# Patient Record
Sex: MALE | Race: WHITE | NOT HISPANIC OR LATINO | URBAN - METROPOLITAN AREA
[De-identification: names, ages, dates, MRNs, and addresses within clinical notes are randomized per-mention and may not be internally consistent; named-entity substitution may affect disease eponyms.]

---

## 2017-02-08 ENCOUNTER — OUTPATIENT (OUTPATIENT)
Dept: OUTPATIENT SERVICES | Facility: HOSPITAL | Age: 77
LOS: 1 days | Discharge: HOME | End: 2017-02-08

## 2017-06-27 DIAGNOSIS — E78.5 HYPERLIPIDEMIA, UNSPECIFIED: ICD-10-CM

## 2017-06-27 DIAGNOSIS — D64.9 ANEMIA, UNSPECIFIED: ICD-10-CM

## 2017-06-27 DIAGNOSIS — I10 ESSENTIAL (PRIMARY) HYPERTENSION: ICD-10-CM

## 2017-06-27 DIAGNOSIS — E78.4 OTHER HYPERLIPIDEMIA: ICD-10-CM

## 2017-06-27 DIAGNOSIS — Z00.00 ENCOUNTER FOR GENERAL ADULT MEDICAL EXAMINATION WITHOUT ABNORMAL FINDINGS: ICD-10-CM

## 2017-06-27 DIAGNOSIS — N39.0 URINARY TRACT INFECTION, SITE NOT SPECIFIED: ICD-10-CM

## 2017-06-27 DIAGNOSIS — E55.9 VITAMIN D DEFICIENCY, UNSPECIFIED: ICD-10-CM

## 2017-06-27 DIAGNOSIS — E11.9 TYPE 2 DIABETES MELLITUS WITHOUT COMPLICATIONS: ICD-10-CM

## 2017-06-27 DIAGNOSIS — E13.9 OTHER SPECIFIED DIABETES MELLITUS WITHOUT COMPLICATIONS: ICD-10-CM

## 2017-06-27 DIAGNOSIS — D64.0 HEREDITARY SIDEROBLASTIC ANEMIA: ICD-10-CM

## 2017-08-05 ENCOUNTER — INPATIENT (INPATIENT)
Facility: HOSPITAL | Age: 77
LOS: 5 days | Discharge: NURSING, REHAB OR LONG-TERM CARE | End: 2017-08-11
Attending: INTERNAL MEDICINE | Admitting: INTERNAL MEDICINE

## 2017-08-05 DIAGNOSIS — S82.209A UNSPECIFIED FRACTURE OF SHAFT OF UNSPECIFIED TIBIA, INITIAL ENCOUNTER FOR CLOSED FRACTURE: ICD-10-CM

## 2017-08-05 DIAGNOSIS — R91.8 OTHER NONSPECIFIC ABNORMAL FINDING OF LUNG FIELD: ICD-10-CM

## 2017-08-05 DIAGNOSIS — Z86.73 PERSONAL HISTORY OF TRANSIENT ISCHEMIC ATTACK (TIA), AND CEREBRAL INFARCTION WITHOUT RESIDUAL DEFICITS: ICD-10-CM

## 2017-08-11 ENCOUNTER — INPATIENT (INPATIENT)
Facility: HOSPITAL | Age: 77
LOS: 18 days | Discharge: HOME HEALTH | End: 2017-08-30
Attending: PHYSICAL MEDICINE & REHABILITATION

## 2017-08-11 DIAGNOSIS — S82.209A UNSPECIFIED FRACTURE OF SHAFT OF UNSPECIFIED TIBIA, INITIAL ENCOUNTER FOR CLOSED FRACTURE: ICD-10-CM

## 2017-08-11 DIAGNOSIS — Z86.73 PERSONAL HISTORY OF TRANSIENT ISCHEMIC ATTACK (TIA), AND CEREBRAL INFARCTION WITHOUT RESIDUAL DEFICITS: ICD-10-CM

## 2017-08-11 DIAGNOSIS — R91.8 OTHER NONSPECIFIC ABNORMAL FINDING OF LUNG FIELD: ICD-10-CM

## 2017-08-14 DIAGNOSIS — E86.0 DEHYDRATION: ICD-10-CM

## 2017-08-14 DIAGNOSIS — I63.9 CEREBRAL INFARCTION, UNSPECIFIED: ICD-10-CM

## 2017-08-14 DIAGNOSIS — N39.0 URINARY TRACT INFECTION, SITE NOT SPECIFIED: ICD-10-CM

## 2017-08-14 DIAGNOSIS — F03.90 UNSPECIFIED DEMENTIA, UNSPECIFIED SEVERITY, WITHOUT BEHAVIORAL DISTURBANCE, PSYCHOTIC DISTURBANCE, MOOD DISTURBANCE, AND ANXIETY: ICD-10-CM

## 2017-08-14 DIAGNOSIS — I63.232 CEREBRAL INFARCTION DUE TO UNSPECIFIED OCCLUSION OR STENOSIS OF LEFT CAROTID ARTERIES: ICD-10-CM

## 2017-08-14 DIAGNOSIS — E11.9 TYPE 2 DIABETES MELLITUS WITHOUT COMPLICATIONS: ICD-10-CM

## 2017-08-14 DIAGNOSIS — I69.351 HEMIPLEGIA AND HEMIPARESIS FOLLOWING CEREBRAL INFARCTION AFFECTING RIGHT DOMINANT SIDE: ICD-10-CM

## 2017-08-14 DIAGNOSIS — R63.4 ABNORMAL WEIGHT LOSS: ICD-10-CM

## 2017-08-14 DIAGNOSIS — F17.210 NICOTINE DEPENDENCE, CIGARETTES, UNCOMPLICATED: ICD-10-CM

## 2017-08-14 DIAGNOSIS — C78.00 SECONDARY MALIGNANT NEOPLASM OF UNSPECIFIED LUNG: ICD-10-CM

## 2017-08-14 DIAGNOSIS — I10 ESSENTIAL (PRIMARY) HYPERTENSION: ICD-10-CM

## 2017-08-14 DIAGNOSIS — E78.5 HYPERLIPIDEMIA, UNSPECIFIED: ICD-10-CM

## 2017-08-14 DIAGNOSIS — Z85.831 PERSONAL HISTORY OF MALIGNANT NEOPLASM OF SOFT TISSUE: ICD-10-CM

## 2017-08-15 DIAGNOSIS — I67.82 CEREBRAL ISCHEMIA: ICD-10-CM

## 2017-08-15 DIAGNOSIS — C78.02 SECONDARY MALIGNANT NEOPLASM OF LEFT LUNG: ICD-10-CM

## 2017-08-15 DIAGNOSIS — Z18.89 OTHER SPECIFIED RETAINED FOREIGN BODY FRAGMENTS: ICD-10-CM

## 2017-08-15 DIAGNOSIS — F01.50 VASCULAR DEMENTIA WITHOUT BEHAVIORAL DISTURBANCE: ICD-10-CM

## 2017-08-15 DIAGNOSIS — I65.22 OCCLUSION AND STENOSIS OF LEFT CAROTID ARTERY: ICD-10-CM

## 2017-08-15 DIAGNOSIS — C78.01 SECONDARY MALIGNANT NEOPLASM OF RIGHT LUNG: ICD-10-CM

## 2017-08-15 DIAGNOSIS — I95.89 OTHER HYPOTENSION: ICD-10-CM

## 2017-08-15 DIAGNOSIS — I63.8 OTHER CEREBRAL INFARCTION: ICD-10-CM

## 2017-08-15 DIAGNOSIS — R33.9 RETENTION OF URINE, UNSPECIFIED: ICD-10-CM

## 2017-08-15 DIAGNOSIS — R29.700 NIHSS SCORE 0: ICD-10-CM

## 2017-08-15 PROBLEM — Z00.00 ENCOUNTER FOR PREVENTIVE HEALTH EXAMINATION: Status: ACTIVE | Noted: 2017-08-15

## 2017-09-04 DIAGNOSIS — B96.20 UNSPECIFIED ESCHERICHIA COLI [E. COLI] AS THE CAUSE OF DISEASES CLASSIFIED ELSEWHERE: ICD-10-CM

## 2017-09-04 DIAGNOSIS — Z87.81 PERSONAL HISTORY OF (HEALED) TRAUMATIC FRACTURE: ICD-10-CM

## 2017-09-04 DIAGNOSIS — E53.8 DEFICIENCY OF OTHER SPECIFIED B GROUP VITAMINS: ICD-10-CM

## 2017-09-04 DIAGNOSIS — I69.351 HEMIPLEGIA AND HEMIPARESIS FOLLOWING CEREBRAL INFARCTION AFFECTING RIGHT DOMINANT SIDE: ICD-10-CM

## 2017-09-04 DIAGNOSIS — R91.1 SOLITARY PULMONARY NODULE: ICD-10-CM

## 2017-09-04 DIAGNOSIS — R33.8 OTHER RETENTION OF URINE: ICD-10-CM

## 2017-09-04 DIAGNOSIS — C78.00 SECONDARY MALIGNANT NEOPLASM OF UNSPECIFIED LUNG: ICD-10-CM

## 2017-09-04 DIAGNOSIS — R53.1 WEAKNESS: ICD-10-CM

## 2017-09-04 DIAGNOSIS — E11.9 TYPE 2 DIABETES MELLITUS WITHOUT COMPLICATIONS: ICD-10-CM

## 2017-09-04 DIAGNOSIS — Z96.0 PRESENCE OF UROGENITAL IMPLANTS: ICD-10-CM

## 2017-09-04 DIAGNOSIS — Z85.831 PERSONAL HISTORY OF MALIGNANT NEOPLASM OF SOFT TISSUE: ICD-10-CM

## 2017-09-04 DIAGNOSIS — N39.0 URINARY TRACT INFECTION, SITE NOT SPECIFIED: ICD-10-CM

## 2017-09-04 DIAGNOSIS — Z95.828 PRESENCE OF OTHER VASCULAR IMPLANTS AND GRAFTS: ICD-10-CM

## 2017-09-04 DIAGNOSIS — Z88.0 ALLERGY STATUS TO PENICILLIN: ICD-10-CM

## 2017-09-04 DIAGNOSIS — F03.90 UNSPECIFIED DEMENTIA WITHOUT BEHAVIORAL DISTURBANCE: ICD-10-CM

## 2017-09-04 DIAGNOSIS — I10 ESSENTIAL (PRIMARY) HYPERTENSION: ICD-10-CM

## 2017-09-04 DIAGNOSIS — Z51.89 ENCOUNTER FOR OTHER SPECIFIED AFTERCARE: ICD-10-CM

## 2017-09-04 DIAGNOSIS — I65.23 OCCLUSION AND STENOSIS OF BILATERAL CAROTID ARTERIES: ICD-10-CM

## 2017-09-04 DIAGNOSIS — E03.9 HYPOTHYROIDISM, UNSPECIFIED: ICD-10-CM

## 2017-09-05 DIAGNOSIS — E51.9 THIAMINE DEFICIENCY, UNSPECIFIED: ICD-10-CM

## 2017-09-05 DIAGNOSIS — R33.9 RETENTION OF URINE, UNSPECIFIED: ICD-10-CM

## 2018-09-02 ENCOUNTER — INPATIENT (INPATIENT)
Facility: HOSPITAL | Age: 78
LOS: 14 days | Discharge: HOME HEALTH | End: 2018-09-17
Attending: INTERNAL MEDICINE | Admitting: INTERNAL MEDICINE
Payer: MEDICARE

## 2018-09-02 VITALS
SYSTOLIC BLOOD PRESSURE: 193 MMHG | RESPIRATION RATE: 16 BRPM | HEART RATE: 86 BPM | TEMPERATURE: 98 F | OXYGEN SATURATION: 96 % | DIASTOLIC BLOOD PRESSURE: 103 MMHG

## 2018-09-02 LAB
ALBUMIN SERPL ELPH-MCNC: 4.1 G/DL — SIGNIFICANT CHANGE UP (ref 3.5–5.2)
ALP SERPL-CCNC: 153 U/L — HIGH (ref 30–115)
ALT FLD-CCNC: <5 U/L — SIGNIFICANT CHANGE UP (ref 0–41)
ANION GAP SERPL CALC-SCNC: 17 MMOL/L — HIGH (ref 7–14)
APPEARANCE UR: ABNORMAL
AST SERPL-CCNC: 14 U/L — SIGNIFICANT CHANGE UP (ref 0–41)
BASOPHILS # BLD AUTO: 0.03 K/UL — SIGNIFICANT CHANGE UP (ref 0–0.2)
BASOPHILS NFR BLD AUTO: 0.4 % — SIGNIFICANT CHANGE UP (ref 0–1)
BILIRUB SERPL-MCNC: 0.7 MG/DL — SIGNIFICANT CHANGE UP (ref 0.2–1.2)
BILIRUB UR-MCNC: NEGATIVE — SIGNIFICANT CHANGE UP
BUN SERPL-MCNC: 10 MG/DL — SIGNIFICANT CHANGE UP (ref 10–20)
CALCIUM SERPL-MCNC: 8.6 MG/DL — SIGNIFICANT CHANGE UP (ref 8.5–10.1)
CHLORIDE SERPL-SCNC: 99 MMOL/L — SIGNIFICANT CHANGE UP (ref 98–110)
CO2 SERPL-SCNC: 24 MMOL/L — SIGNIFICANT CHANGE UP (ref 17–32)
COLOR SPEC: YELLOW — SIGNIFICANT CHANGE UP
CREAT SERPL-MCNC: 0.9 MG/DL — SIGNIFICANT CHANGE UP (ref 0.7–1.5)
DIFF PNL FLD: ABNORMAL
EOSINOPHIL # BLD AUTO: 0.05 K/UL — SIGNIFICANT CHANGE UP (ref 0–0.7)
EOSINOPHIL NFR BLD AUTO: 0.7 % — SIGNIFICANT CHANGE UP (ref 0–8)
GAS PNL BLDV: SIGNIFICANT CHANGE UP
GLUCOSE SERPL-MCNC: 92 MG/DL — SIGNIFICANT CHANGE UP (ref 70–99)
GLUCOSE UR QL: NEGATIVE MG/DL — SIGNIFICANT CHANGE UP
HCT VFR BLD CALC: 43.9 % — SIGNIFICANT CHANGE UP (ref 42–52)
HGB BLD-MCNC: 15.1 G/DL — SIGNIFICANT CHANGE UP (ref 14–18)
IMM GRANULOCYTES NFR BLD AUTO: 0.3 % — SIGNIFICANT CHANGE UP (ref 0.1–0.3)
KETONES UR-MCNC: ABNORMAL
LEUKOCYTE ESTERASE UR-ACNC: ABNORMAL
LYMPHOCYTES # BLD AUTO: 1.01 K/UL — LOW (ref 1.2–3.4)
LYMPHOCYTES # BLD AUTO: 14.3 % — LOW (ref 20.5–51.1)
MAGNESIUM SERPL-MCNC: 1.9 MG/DL — SIGNIFICANT CHANGE UP (ref 1.8–2.4)
MCHC RBC-ENTMCNC: 31.5 PG — HIGH (ref 27–31)
MCHC RBC-ENTMCNC: 34.4 G/DL — SIGNIFICANT CHANGE UP (ref 32–37)
MCV RBC AUTO: 91.6 FL — SIGNIFICANT CHANGE UP (ref 80–94)
MONOCYTES # BLD AUTO: 0.28 K/UL — SIGNIFICANT CHANGE UP (ref 0.1–0.6)
MONOCYTES NFR BLD AUTO: 4 % — SIGNIFICANT CHANGE UP (ref 1.7–9.3)
NEUTROPHILS # BLD AUTO: 5.67 K/UL — SIGNIFICANT CHANGE UP (ref 1.4–6.5)
NEUTROPHILS NFR BLD AUTO: 80.3 % — HIGH (ref 42.2–75.2)
NITRITE UR-MCNC: POSITIVE
PH UR: 7 — SIGNIFICANT CHANGE UP (ref 5–8)
PLATELET # BLD AUTO: 210 K/UL — SIGNIFICANT CHANGE UP (ref 130–400)
POTASSIUM SERPL-MCNC: 4.8 MMOL/L — SIGNIFICANT CHANGE UP (ref 3.5–5)
POTASSIUM SERPL-SCNC: 4.8 MMOL/L — SIGNIFICANT CHANGE UP (ref 3.5–5)
PROT SERPL-MCNC: 6.9 G/DL — SIGNIFICANT CHANGE UP (ref 6–8)
PROT UR-MCNC: 30 MG/DL
RBC # BLD: 4.79 M/UL — SIGNIFICANT CHANGE UP (ref 4.7–6.1)
RBC # FLD: 13.2 % — SIGNIFICANT CHANGE UP (ref 11.5–14.5)
SODIUM SERPL-SCNC: 140 MMOL/L — SIGNIFICANT CHANGE UP (ref 135–146)
SP GR SPEC: 1.02 — SIGNIFICANT CHANGE UP (ref 1.01–1.03)
TROPONIN T SERPL-MCNC: <0.01 NG/ML — SIGNIFICANT CHANGE UP
UROBILINOGEN FLD QL: 1 MG/DL (ref 0.2–0.2)
WBC # BLD: 7.06 K/UL — SIGNIFICANT CHANGE UP (ref 4.8–10.8)
WBC # FLD AUTO: 7.06 K/UL — SIGNIFICANT CHANGE UP (ref 4.8–10.8)

## 2018-09-02 RX ORDER — TAMSULOSIN HYDROCHLORIDE 0.4 MG/1
0.4 CAPSULE ORAL AT BEDTIME
Qty: 0 | Refills: 0 | Status: DISCONTINUED | OUTPATIENT
Start: 2018-09-02 | End: 2018-09-17

## 2018-09-02 RX ORDER — IPRATROPIUM/ALBUTEROL SULFATE 18-103MCG
3 AEROSOL WITH ADAPTER (GRAM) INHALATION ONCE
Qty: 0 | Refills: 0 | Status: COMPLETED | OUTPATIENT
Start: 2018-09-02 | End: 2018-09-02

## 2018-09-02 RX ORDER — HEPARIN SODIUM 5000 [USP'U]/ML
5000 INJECTION INTRAVENOUS; SUBCUTANEOUS EVERY 8 HOURS
Qty: 0 | Refills: 0 | Status: DISCONTINUED | OUTPATIENT
Start: 2018-09-02 | End: 2018-09-05

## 2018-09-02 RX ORDER — ASPIRIN/CALCIUM CARB/MAGNESIUM 324 MG
1 TABLET ORAL
Qty: 0 | Refills: 0 | COMMUNITY

## 2018-09-02 RX ORDER — CLOPIDOGREL BISULFATE 75 MG/1
75 TABLET, FILM COATED ORAL DAILY
Qty: 0 | Refills: 0 | Status: DISCONTINUED | OUTPATIENT
Start: 2018-09-02 | End: 2018-09-17

## 2018-09-02 RX ORDER — CLOPIDOGREL BISULFATE 75 MG/1
1 TABLET, FILM COATED ORAL
Qty: 0 | Refills: 0 | COMMUNITY

## 2018-09-02 RX ORDER — GABAPENTIN 400 MG/1
100 CAPSULE ORAL
Qty: 0 | Refills: 0 | Status: DISCONTINUED | OUTPATIENT
Start: 2018-09-02 | End: 2018-09-17

## 2018-09-02 RX ORDER — GABAPENTIN 400 MG/1
2 CAPSULE ORAL
Qty: 0 | Refills: 0 | COMMUNITY

## 2018-09-02 RX ORDER — SODIUM CHLORIDE 9 MG/ML
1000 INJECTION, SOLUTION INTRAVENOUS ONCE
Qty: 0 | Refills: 0 | Status: COMPLETED | OUTPATIENT
Start: 2018-09-02 | End: 2018-09-02

## 2018-09-02 RX ORDER — CEFTRIAXONE 500 MG/1
1 INJECTION, POWDER, FOR SOLUTION INTRAMUSCULAR; INTRAVENOUS ONCE
Qty: 0 | Refills: 0 | Status: DISCONTINUED | OUTPATIENT
Start: 2018-09-02 | End: 2018-09-02

## 2018-09-02 RX ORDER — MAGNESIUM OXIDE 400 MG ORAL TABLET 241.3 MG
1 TABLET ORAL
Qty: 0 | Refills: 0 | COMMUNITY

## 2018-09-02 RX ORDER — ASPIRIN/CALCIUM CARB/MAGNESIUM 324 MG
81 TABLET ORAL DAILY
Qty: 0 | Refills: 0 | Status: DISCONTINUED | OUTPATIENT
Start: 2018-09-02 | End: 2018-09-17

## 2018-09-02 RX ORDER — MAGNESIUM OXIDE 400 MG ORAL TABLET 241.3 MG
400 TABLET ORAL DAILY
Qty: 0 | Refills: 0 | Status: DISCONTINUED | OUTPATIENT
Start: 2018-09-02 | End: 2018-09-17

## 2018-09-02 RX ORDER — TAMSULOSIN HYDROCHLORIDE 0.4 MG/1
1 CAPSULE ORAL
Qty: 0 | Refills: 0 | COMMUNITY

## 2018-09-02 RX ADMIN — TAMSULOSIN HYDROCHLORIDE 0.4 MILLIGRAM(S): 0.4 CAPSULE ORAL at 23:32

## 2018-09-02 RX ADMIN — Medication 3 MILLILITER(S): at 18:09

## 2018-09-02 RX ADMIN — SODIUM CHLORIDE 1000 MILLILITER(S): 9 INJECTION, SOLUTION INTRAVENOUS at 20:11

## 2018-09-02 NOTE — H&P ADULT - ASSESSMENT
77 M w/ pmh of htn, dld, cva x4 w/ r hemiparesis, poor historian who presents to the ed c/o generalized weakness & loss of appetite, found to be with positive UA.    # UTI  - C/w Levaquin  - F/u Urine Cx & Blood Cx    # HO HTN / DLD / CVA  - C/w home meds    # DVTppx; Heparin  # DASHdiet  # Full code  # Dispo: Pending clinical course, PT/Rehab.

## 2018-09-02 NOTE — H&P ADULT - HISTORY OF PRESENT ILLNESS
77 M w/ pmh of htn, dld, cva x4 w/ r hemiparesis, poor historian who presents to the ed c/o generalized weakness & loss of appetite. For the past 4 days the pt has not been getting out of bed & has decreased PO intake which per the wife is unusual for him and is not his baseline. Pt denies SOB, chest pain, abd pain, n/v/d, urinary complaints. No fever.

## 2018-09-02 NOTE — ED PROVIDER NOTE - PHYSICAL EXAMINATION
Constitutional: Well developed, well nourished. NAD.  Head: Normocephalic, atraumatic.  Eyes: PERRL. EOMI.  ENT: No nasal discharge. Mucous membranes moist.  Neck: Supple. Painless ROM.  Cardiovascular: Normal S1, S2. Regular rate and rhythm. No murmurs, rubs, or gallops.  Pulmonary: Normal respiratory rate and effort. Inspiratory and expiratory wheezing.  Abdominal: Soft. Nondistended. Nontender. No rebound, guarding, rigidity.  Extremities. Pelvis stable. No lower extremity edema, symmetric calves.  Skin: No rashes, cyanosis.  Neuro: AAOx3. No focal neurological deficits.  Psych: Normal mood. Normal affect.

## 2018-09-02 NOTE — ED ADULT NURSE NOTE - OBJECTIVE STATEMENT
pt presents with generalized weakness that started a couple of days ago, right sided weakness residual from past CVA

## 2018-09-02 NOTE — ED PROVIDER NOTE - OBJECTIVE STATEMENT
Pt is a 76 y/o male with hx of HTN, DM, DLD, CVA, hypothyroidism, h/o metastatic malignancy currently in remission, presents to ED for weakness sicne yesterday. Pt usually walks around but since yesterday was weak. No increase in cough (pt smokes tobacco). No SOB, chest pain, abd pain, n/v/d, urinary complaints. No fever. Pt is a 78 y/o male with hx of HTN, DLD, CVA on plavix, h/o sarcoma s/p surgery currently in remission, presents to ED for weakness sicne yesterday. Pt usually walks around but since yesterday was weak. No increase in cough (pt smokes tobacco). No SOB, chest pain, abd pain, n/v/d, urinary complaints. No fever. Pt is a 78 y/o male with hx of HTN, DLD, TIAs on plavix, h/o sarcoma s/p surgery currently in remission, presents to ED for weakness sicne yesterday. Pt usually walks around but since yesterday was weak. No increase in cough (pt smokes tobacco). No SOB, chest pain, abd pain, n/v/d, urinary complaints. No fever.

## 2018-09-02 NOTE — H&P ADULT - NSHPLABSRESULTS_GEN_ALL_CORE
LABS:                        15.1   7.06  )-----------( 210      ( 02 Sep 2018 16:12 )             43.9         140  |  99  |  10  ----------------------------<  92  4.8   |  24  |  0.9    Ca    8.6      02 Sep 2018 16:12  Mg     1.9         TPro  6.9  /  Alb  4.1  /  TBili  0.7  /  DBili  x   /  AST  14  /  ALT  <5  /  AlkPhos  153<H>        Urinalysis Basic - ( 02 Sep 2018 16:12 )    Color: Yellow / Appearance: Cloudy / S.020 / pH: x  Gluc: x / Ketone: Trace  / Bili: Negative / Urobili: 1.0 mg/dL   Blood: x / Protein: 30 mg/dL / Nitrite: Positive   Leuk Esterase: Large / RBC: 25-50 /HPF / WBC >50 /HPF   Sq Epi: x / Non Sq Epi: Few /HPF / Bacteria: Many /HPF        Troponin T, Serum: <0.01 ng/mL (18 @ 16:12)      CARDIAC MARKERS ( 02 Sep 2018 16:12 )  x     / <0.01 ng/mL / x     / x     / x          RADIOLOGY:

## 2018-09-02 NOTE — H&P ADULT - ATTENDING COMMENTS
77 yr old male presented with generalized weakness and loss of appetite  VITAL SIGNS (Last 24 hrs):  T(C): 35.8 (09-03-18 @ 07:15), Max: 37.1 (09-02-18 @ 17:33)  HR: 76 (09-03-18 @ 07:15) (69 - 86)  BP: 168/86 (09-03-18 @ 07:15) (166/74 - 193/103)  RR: 18 (09-03-18 @ 07:15) (16 - 18)  SpO2: 97% (09-03-18 @ 07:15) (96% - 97%)  Wt(kg): --  Daily     Daily     I&O's Summary                        13.0   4.88  )-----------( 184      ( 03 Sep 2018 05:44 )             38.3   09-03    139  |  102  |  9<L>  ----------------------------<  90  5.1<H>   |  23  |  0.9    Ca    8.5      03 Sep 2018 05:44  Mg     1.9     09-02    TPro  6.9  /  Alb  4.1  /  TBili  0.7  /  DBili  x   /  AST  14  /  ALT  <5  /  AlkPhos  153<H>  09-02  O/E  AAOX3  CHEST-B/L AIR ENTRY  CVS-S1S2N  ABD/ GI- SOFT , BS+  NO EDEMA  Assessment and plan:  # UTI- on levaquin will await cultures  # CXR lung mass- wife refused workup in 2017 and CXR not changed much  # HTN-only on clonidine  # BPH- on tamsulosin  # CVA-on aspirin  and plavix 77 yr old male presented with generalized weakness and loss of appetite  VITAL SIGNS (Last 24 hrs):  T(C): 35.8 (09-03-18 @ 07:15), Max: 37.1 (09-02-18 @ 17:33)  HR: 76 (09-03-18 @ 07:15) (69 - 86)  BP: 168/86 (09-03-18 @ 07:15) (166/74 - 193/103)  RR: 18 (09-03-18 @ 07:15) (16 - 18)  SpO2: 97% (09-03-18 @ 07:15) (96% - 97%)  Wt(kg): --  Daily     Daily     I&O's Summary                        13.0   4.88  )-----------( 184      ( 03 Sep 2018 05:44 )             38.3   09-03    139  |  102  |  9<L>  ----------------------------<  90  5.1<H>   |  23  |  0.9    Ca    8.5      03 Sep 2018 05:44  Mg     1.9     09-02    TPro  6.9  /  Alb  4.1  /  TBili  0.7  /  DBili  x   /  AST  14  /  ALT  <5  /  AlkPhos  153<H>  09-02  ekg- NSR rate 74/min incomplete RBBB  O/E  AAOX3  CHEST-B/L AIR ENTRY  CVS-S1S2N  ABD/ GI- SOFT , BS+  NO EDEMA  Assessment and plan:  # UTI- on levaquin will await cultures  # CXR lung mass- wife refused workup in 2017 and CXR not changed much  # HTN-only on clonidine  # BPH- on tamsulosin  # CVA-on aspirin  and plavix 77 yr old male presented with generalized weakness and loss of appetite  VITAL SIGNS (Last 24 hrs):  T(C): 35.8 (09-03-18 @ 07:15), Max: 37.1 (09-02-18 @ 17:33)  HR: 76 (09-03-18 @ 07:15) (69 - 86)  BP: 168/86 (09-03-18 @ 07:15) (166/74 - 193/103)  RR: 18 (09-03-18 @ 07:15) (16 - 18)  SpO2: 97% (09-03-18 @ 07:15) (96% - 97%)  Wt(kg): --  Daily     Daily     I&O's Summary                        13.0   4.88  )-----------( 184      ( 03 Sep 2018 05:44 )             38.3   09-03    139  |  102  |  9<L>  ----------------------------<  90  5.1<H>   |  23  |  0.9    Ca    8.5      03 Sep 2018 05:44  Mg     1.9     09-02    TPro  6.9  /  Alb  4.1  /  TBili  0.7  /  DBili  x   /  AST  14  /  ALT  <5  /  AlkPhos  153<H>  09-02  ekg- NSR rate 74/min incomplete RBBB  O/E  AAOX1  CHEST-B/L AIR ENTRY  CVS-S1S2N  ABD/ GI- SOFT , BS+  cns- bed bound and can move lower and  upper extremities  NO EDEMA  Assessment and plan:  # UTI- on levaquin will await cultures  # CXR lung mass- wife refused workup in 2017 and CXR not changed much over several years as per her but son wants to do further w/u  will get ultrasound of abd- though they do not see any change in bowel habits.  # HTN-only on clonidine  # BPH- on tamsulosin  # CVA-on aspirin  and plavix-- wife could not say why not on lipitor  # dementia advanced -bed bound needs HHA services at home

## 2018-09-02 NOTE — ED PROVIDER NOTE - NS ED ROS FT
Constitutional: No fever, chills.  Eyes: No visual changes.  ENT: No hearing changes. No sore throat.  Neck: No neck pain or stiffness.  Cardiovascular: No chest pain, palpitations, edema.  Pulmonary: No SOB. + cough. No hemoptysis.  Abdominal: No abdominal pain, nausea, vomiting, diarrhea.  : No dysuria, frequency.  Neuro: No headache, syncope, dizziness.  MS: No back pain. No calf pain/swelling.  Psych: No suicidal ideations.

## 2018-09-02 NOTE — ED PROVIDER NOTE - PROGRESS NOTE DETAILS
I personally evaluated the patient. I reviewed the Resident’s note (as assigned above), and agree with the findings and plan except as documented in my note.   76 y/o M with CVA x4, R sided hemiparesis and HTN, here due to generalized weakness. Pt is verbal but as per wife pt is a very poor historian. Wife notes that pt has been having generalized weakness for the past 4 days and has not been getting out of bed with decreased PO intake. Pt usually gets up and moves around during the day but notes that last stroke also presented like this. Denies trauma, change in medications, fever/chills, CP, SOB, urinary or bowel sx. Pt is on Plavix and baby aspirin daily, wife notes pt does not have a neurologist but has a physician that does home visits who comes to evaluate him. Pt smokes 4 packs of cigarettes a day. Pt denies any specific SX but is confused to time and place. Wife notes that this is baseline. On exam vitals noted. Lungs with (+) rhonchi b/l. S1S2. Abdomen NTND. Extremities no C/C/E. Neuro limited due to pt’s condition, (+) RUE, RLE and LLE weakness. A/P: Will get labs, CT head, eval for infection and reassess. Will treat UTI. Family states pt not eating at home, not taking meds - will admit for abx. Hospitalist dorothyd. Endorsed to MAR.

## 2018-09-02 NOTE — H&P ADULT - NSHPPHYSICALEXAM_GEN_ALL_CORE
VITALS:   T(F): 98.8  HR: 86  BP: 193/103  RR: 16  SpO2: 96%    PHYSICAL EXAM:  GEN: No acute distress  LUNGS: Rhochi B/L   HEART: S1/S2 present. RRR.   ABD: Soft, non-tender, non-distended. Bowel sounds present  EXT: NC/NC/NE/REYNOLDS  NEURO: RUE, RLE and LLE weakness VITALS:   T(F): 98.8  HR: 86  BP: 193/103  RR: 16  SpO2: 96%    PHYSICAL EXAM:  GEN: No acute distress  LUNGS: Rhochi B/L   HEART: S1/S2 present. RRR.   ABD: Soft, non-tender, non-distended. Bowel sounds present  EXT: NC/NC/NE/REYNOLDS  NEURO: RUE, RLE and LLE weakness. AOX1

## 2018-09-03 LAB
ANION GAP SERPL CALC-SCNC: 14 MMOL/L — SIGNIFICANT CHANGE UP (ref 7–14)
BASOPHILS # BLD AUTO: 0.02 K/UL — SIGNIFICANT CHANGE UP (ref 0–0.2)
BASOPHILS NFR BLD AUTO: 0.4 % — SIGNIFICANT CHANGE UP (ref 0–1)
BUN SERPL-MCNC: 9 MG/DL — LOW (ref 10–20)
CALCIUM SERPL-MCNC: 8.5 MG/DL — SIGNIFICANT CHANGE UP (ref 8.5–10.1)
CHLORIDE SERPL-SCNC: 102 MMOL/L — SIGNIFICANT CHANGE UP (ref 98–110)
CO2 SERPL-SCNC: 23 MMOL/L — SIGNIFICANT CHANGE UP (ref 17–32)
CREAT SERPL-MCNC: 0.9 MG/DL — SIGNIFICANT CHANGE UP (ref 0.7–1.5)
EOSINOPHIL # BLD AUTO: 0.06 K/UL — SIGNIFICANT CHANGE UP (ref 0–0.7)
EOSINOPHIL NFR BLD AUTO: 1.2 % — SIGNIFICANT CHANGE UP (ref 0–8)
GLUCOSE SERPL-MCNC: 90 MG/DL — SIGNIFICANT CHANGE UP (ref 70–99)
HCT VFR BLD CALC: 38.3 % — LOW (ref 42–52)
HGB BLD-MCNC: 13 G/DL — LOW (ref 14–18)
IMM GRANULOCYTES NFR BLD AUTO: 0.4 % — HIGH (ref 0.1–0.3)
LYMPHOCYTES # BLD AUTO: 0.87 K/UL — LOW (ref 1.2–3.4)
LYMPHOCYTES # BLD AUTO: 17.8 % — LOW (ref 20.5–51.1)
MCHC RBC-ENTMCNC: 31.3 PG — HIGH (ref 27–31)
MCHC RBC-ENTMCNC: 33.9 G/DL — SIGNIFICANT CHANGE UP (ref 32–37)
MCV RBC AUTO: 92.1 FL — SIGNIFICANT CHANGE UP (ref 80–94)
MONOCYTES # BLD AUTO: 0.36 K/UL — SIGNIFICANT CHANGE UP (ref 0.1–0.6)
MONOCYTES NFR BLD AUTO: 7.4 % — SIGNIFICANT CHANGE UP (ref 1.7–9.3)
NEUTROPHILS # BLD AUTO: 3.55 K/UL — SIGNIFICANT CHANGE UP (ref 1.4–6.5)
NEUTROPHILS NFR BLD AUTO: 72.8 % — SIGNIFICANT CHANGE UP (ref 42.2–75.2)
NRBC # BLD: 0 /100 WBCS — SIGNIFICANT CHANGE UP (ref 0–0)
PLATELET # BLD AUTO: 184 K/UL — SIGNIFICANT CHANGE UP (ref 130–400)
POTASSIUM SERPL-MCNC: 5.1 MMOL/L — HIGH (ref 3.5–5)
POTASSIUM SERPL-SCNC: 5.1 MMOL/L — HIGH (ref 3.5–5)
RBC # BLD: 4.16 M/UL — LOW (ref 4.7–6.1)
RBC # FLD: 13.2 % — SIGNIFICANT CHANGE UP (ref 11.5–14.5)
SODIUM SERPL-SCNC: 139 MMOL/L — SIGNIFICANT CHANGE UP (ref 135–146)
WBC # BLD: 4.88 K/UL — SIGNIFICANT CHANGE UP (ref 4.8–10.8)
WBC # FLD AUTO: 4.88 K/UL — SIGNIFICANT CHANGE UP (ref 4.8–10.8)

## 2018-09-03 RX ORDER — ATORVASTATIN CALCIUM 80 MG/1
20 TABLET, FILM COATED ORAL AT BEDTIME
Qty: 0 | Refills: 0 | Status: DISCONTINUED | OUTPATIENT
Start: 2018-09-03 | End: 2018-09-17

## 2018-09-03 RX ADMIN — Medication 81 MILLIGRAM(S): at 12:57

## 2018-09-03 RX ADMIN — HEPARIN SODIUM 5000 UNIT(S): 5000 INJECTION INTRAVENOUS; SUBCUTANEOUS at 06:19

## 2018-09-03 RX ADMIN — Medication 0.1 MILLIGRAM(S): at 18:53

## 2018-09-03 RX ADMIN — ATORVASTATIN CALCIUM 20 MILLIGRAM(S): 80 TABLET, FILM COATED ORAL at 22:16

## 2018-09-03 RX ADMIN — HEPARIN SODIUM 5000 UNIT(S): 5000 INJECTION INTRAVENOUS; SUBCUTANEOUS at 13:05

## 2018-09-03 RX ADMIN — HEPARIN SODIUM 5000 UNIT(S): 5000 INJECTION INTRAVENOUS; SUBCUTANEOUS at 22:16

## 2018-09-03 RX ADMIN — Medication 0.1 MILLIGRAM(S): at 06:19

## 2018-09-03 RX ADMIN — GABAPENTIN 100 MILLIGRAM(S): 400 CAPSULE ORAL at 18:53

## 2018-09-03 RX ADMIN — CLOPIDOGREL BISULFATE 75 MILLIGRAM(S): 75 TABLET, FILM COATED ORAL at 12:57

## 2018-09-03 RX ADMIN — GABAPENTIN 100 MILLIGRAM(S): 400 CAPSULE ORAL at 06:19

## 2018-09-03 RX ADMIN — MAGNESIUM OXIDE 400 MG ORAL TABLET 400 MILLIGRAM(S): 241.3 TABLET ORAL at 12:57

## 2018-09-03 RX ADMIN — TAMSULOSIN HYDROCHLORIDE 0.4 MILLIGRAM(S): 0.4 CAPSULE ORAL at 22:16

## 2018-09-03 NOTE — PROGRESS NOTE ADULT - SUBJECTIVE AND OBJECTIVE BOX
Patient is a 77y old  Male who presents with a chief complaint of  burning on urination.  (02 Sep 2018 21:31)      PAST MEDICAL & SURGICAL HISTORY:  Hypertension  CVA (cerebral vascular accident)      MEDICATIONS  (STANDING):  aspirin  chewable 81 milliGRAM(s) Oral daily  cloNIDine 0.1 milliGRAM(s) Oral two times a day  clopidogrel Tablet 75 milliGRAM(s) Oral daily  gabapentin 100 milliGRAM(s) Oral two times a day  heparin  Injectable 5000 Unit(s) SubCutaneous every 8 hours  levoFLOXacin IVPB 750 milliGRAM(s) IV Intermittent every 24 hours  magnesium oxide 400 milliGRAM(s) Oral daily  tamsulosin 0.4 milliGRAM(s) Oral at bedtime    MEDICATIONS  (PRN):      Overnight events:    Vital Signs Last 24 Hrs  T(C): 35.8 (03 Sep 2018 07:15), Max: 37.1 (02 Sep 2018 17:33)  T(F): 96.4 (03 Sep 2018 07:15), Max: 98.8 (02 Sep 2018 17:33)  HR: 76 (03 Sep 2018 07:15) (69 - 86)  BP: 168/86 (03 Sep 2018 07:15) (166/74 - 193/103)  BP(mean): --  RR: 18 (03 Sep 2018 07:15) (16 - 18)  SpO2: 97% (03 Sep 2018 07:15) (96% - 97%)  CAPILLARY BLOOD GLUCOSE  98 (02 Sep 2018 17:33)      POCT Blood Glucose.: 98 mg/dL (02 Sep 2018 17:50)    I&O's Summary      PHYSICAL EXAM:  GENERAL: NAD, speaks in full sentences, no signs of respiratory distress  HEAD:  Atraumatic, Normocephalic  EYES: EOMI, PERRLA, conjunctiva and sclera clear  NECK: Supple, No JVD  CHEST/LUNG: Clear to auscultation bilaterally; No wheeze; No crackles; No accessory muscles used  HEART: Regular rate and rhythm; No murmurs;   ABDOMEN: Soft, Nontender, Nondistended; Bowel sounds present; No guarding  EXTREMITIES:  2+ Peripheral Pulses, No cyanosis or edema  PSYCH: AAOx2  NEUROLOGY: non-focal  SKIN: No rashes or lesions        Labs:                        13.0   4.88  )-----------( 184      ( 03 Sep 2018 05:44 )             38.3             09-03    139  |  102  |  9<L>  ----------------------------<  90  5.1<H>   |  23  |  0.9    Ca    8.5      03 Sep 2018 05:44  Mg     1.9         TPro  6.9  /  Alb  4.1  /  TBili  0.7  /  DBili  x   /  AST  14  /  ALT  <5  /  AlkPhos  153<H>      LIVER FUNCTIONS - ( 02 Sep 2018 16:12 )  Alb: 4.1 g/dL / Pro: 6.9 g/dL / ALK PHOS: 153 U/L / ALT: <5 U/L / AST: 14 U/L / GGT: x                   CARDIAC MARKERS ( 02 Sep 2018 16:12 )  x     / <0.01 ng/mL / x     / x     / x            Urinalysis Basic - ( 02 Sep 2018 16:12 )    Color: Yellow / Appearance: Cloudy / S.020 / pH: x  Gluc: x / Ketone: Trace  / Bili: Negative / Urobili: 1.0 mg/dL   Blood: x / Protein: 30 mg/dL / Nitrite: Positive   Leuk Esterase: Large / RBC: 25-50 /HPF / WBC >50 /HPF   Sq Epi: x / Non Sq Epi: Few /HPF / Bacteria: Many /HPF          Imaging:    ECG:    T(C): 35.8 (18 @ 07:15), Max: 37.1 (18 @ 17:33)  HR: 76 (18 @ 07:15) (69 - 86)  BP: 168/86 (18 @ 07:15) (166/74 - 193/103)  RR: 18 (18 @ 07:15) (16 - 18)  SpO2: 97% (18 @ 07:15) (96% - 97%)

## 2018-09-03 NOTE — CONSULT NOTE ADULT - SUBJECTIVE AND OBJECTIVE BOX
Patient is a 77y old  Male who presents with a chief complaint of UTI (02 Sep 2018 21:31)    HPI:  77 M w/ pmh of htn, dld, cva x4 w/ r hemiparesis, poor historian who presents to the ed c/o generalized weakness & loss of appetite. For the past 4 days the pt has not been getting out of bed & has decreased PO intake which per the wife is unusual for him and is not his baseline. Pt denies SOB, chest pain, abd pain, n/v/d, urinary complaints. No fever. (02 Sep 2018 21:31)      PAST MEDICAL & SURGICAL HISTORY:  Hypertension  CVA (cerebral vascular accident)      Hospital Course: On antibiotics secondary to UTI    TODAY'S SUBJECTIVE & REVIEW OF SYMPTOMS:     Constitutional Weakness   Cardio WNL   Resp WNL   GI WNL  Heme WNL  Endo WNL  Skin WNL  MSK WNL  Neuro WNL  Cognitive WNL  Psych WNL  + urine incontinence    MEDICATIONS  (STANDING):  aspirin  chewable 81 milliGRAM(s) Oral daily  cloNIDine 0.1 milliGRAM(s) Oral two times a day  clopidogrel Tablet 75 milliGRAM(s) Oral daily  gabapentin 100 milliGRAM(s) Oral two times a day  heparin  Injectable 5000 Unit(s) SubCutaneous every 8 hours  levoFLOXacin IVPB 750 milliGRAM(s) IV Intermittent every 24 hours  magnesium oxide 400 milliGRAM(s) Oral daily  tamsulosin 0.4 milliGRAM(s) Oral at bedtime    MEDICATIONS  (PRN):      FAMILY HISTORY:  No pertinent family history in first degree relatives      Allergies    penicillin (Unknown)    Intolerances        SOCIAL HISTORY:    [    ] Etoh  [    ] Smoking  [    ] Substance abuse     Home Environment:  [    ] Home Alone  [ x   ] Lives with Family  [    ] Home Health Aid    Dwelling:  [    ] Apartment  [   x ] Private House  [    ] Adult Home  [    ] Skilled Nursing Facility      [    ] Short Term  [    ] Long Term  [ x   ] Stairs ? chairlift                          [    ] Elevator     FUNCTIONAL STATUS PTA: (Check all that apply)  Ambulation: [     ]Independent    [    ] Dependent     [    ] Non-Ambulatory  Assistive Device: [    ] SA Cane  [    ]  Q Cane  [    ] Walker  [    ]  Wheelchair  ADL : [    ] Independent  [    ]  Dependent   UNABLE TO ASSESS FUNCTIONAL STATUS secondary to EXP APHASIA    Vital Signs Last 24 Hrs  T(C): 35.8 (03 Sep 2018 07:15), Max: 37.1 (02 Sep 2018 17:33)  T(F): 96.4 (03 Sep 2018 07:15), Max: 98.8 (02 Sep 2018 17:33)  HR: 76 (03 Sep 2018 07:15) (69 - 86)  BP: 168/86 (03 Sep 2018 07:15) (166/74 - 193/103)  BP(mean): --  RR: 18 (03 Sep 2018 07:15) (16 - 18)  SpO2: 97% (03 Sep 2018 07:15) (96% - 97%)      PHYSICAL EXAM: Alert & Oriented X1 +APHASIC  GENERAL: NAD, well-groomed, well-developed  HEAD:  Atraumatic, Normocephalic  EYES: EOMI, PERRLA, conjunctiva and sclera clear  NECK: Supple, No JVD, Normal thyroid  CHEST/LUNG: Clear  bilaterally; No rales, rhonchi, wheezing, or rubs  HEART: Regular rate and rhythm; No murmurs, rubs, or gallops  ABDOMEN: Soft, Nontender, Nondistended; Bowel sounds present  EXTREMITIES: RLE with Atrophy, RUE with increased flex tone    NERVOUS SYSTEM:  Cranial Nerves 2-12 intact [  x  ] Abnormal  [    ]  ROM: WFL all extremities [   x ]  Abnormal [     ]  Motor Strength: WFL all extremities  [    ]  Abnormal [ x   ]2=3/5 RUE 4/5 RLE 4+/5 LUE/LLE  Sensation: intact to light touch [  x  ] Abnormal [    ]      FUNCTIONAL STATUS:  Bed Mobility: [   ]  Independent [    ]  Supervision [ x   ]  Needs Assistance [  ]  N/A  Transfers: [    ]  Independent [    ]  Supervision [    ]  Needs Assistance [ x   ]  N/A    Ambulation:  [    ]  Independent [    ]  Supervision [    ]  Needs Assistance [  x  ]  N/A   ADL:  [    ]   Independent [x    ] Requires Assistance [    ] N/A   GOOD LONG SITTING BALANCE    LABS:                        13.0   4.88  )-----------( 184      ( 03 Sep 2018 05:44 )             38.3     09-03    139  |  102  |  9<L>  ----------------------------<  90  5.1<H>   |  23  |  0.9    Ca    8.5      03 Sep 2018 05:44  Mg     1.9     09-02    TPro  6.9  /  Alb  4.1  /  TBili  0.7  /  DBili  x   /  AST  14  /  ALT  <5  /  AlkPhos  153<H>  09-02      Urinalysis Basic - ( 02 Sep 2018 16:12 )    Color: Yellow / Appearance: Cloudy / S.020 / pH: x  Gluc: x / Ketone: Trace  / Bili: Negative / Urobili: 1.0 mg/dL   Blood: x / Protein: 30 mg/dL / Nitrite: Positive   Leuk Esterase: Large / RBC: 25-50 /HPF / WBC >50 /HPF   Sq Epi: x / Non Sq Epi: Few /HPF / Bacteria: Many /HPF        RADIOLOGY & ADDITIONAL STUDIES:

## 2018-09-03 NOTE — CONSULT NOTE ADULT - ASSESSMENT
IMPRESSION: Rehab of Debilitation secondary to UTI/ Hx MultiCVA    PRECAUTIONS: [  x  ] Cardiac  [    ] Respiratory  [    ] Seizures [    ] Contact Isolation  [    ] Droplet Isolation  [    ] Other    Weight Bearing Status:     RECOMMENDATION:    Out of Bed to Chair     DVT/Decubiti Prophylaxis    REHAB PLAN:     [ x    ] Bedside P/T 3-5 times a week   [     ] Bedside O/T  2-3 times a week   [     ] No Rehab Therapy Indicated   [     ]  Speech Therapy   Conditioning/ROM                                 ADL  Bed Mobility                                            Conditioning/ROM  Transfers                                                  Bed Mobility  Sitting /Standing Balance                      Transfers                                        Gait Training                                            Sitting/Standing Balance  Stair Training [   ]Applicable                 Home equipment Eval                                                                     Splinting  [   ] Only      GOALS:   ADL   [    ]   Independent         Transfers  [    ] Independent            Ambulation  [     ] Independent     [   x  ] With device                            [  x  ]  CG                                               [  x  ]  CG                                                    [ x    ] CG                            [    ] Min A                                          [    ] Min A                                                [     ] Min  A          DISCHARGE PLAN:   [     ]  Good candidate for Intensive Rehabilitation/Hospital based-4A [facility]                                             Will tolerate 3hrs Intensive Rehab Daily                                       [  x    ]  Short Term Rehab in Skilled Nursing Facility                            VS                                     [   x   ]  Home with Outpatient or VN services                                         [      ]  Possible Candidate for Intensive Hospital based Rehab

## 2018-09-03 NOTE — PROGRESS NOTE ADULT - ASSESSMENT
77 M w/ pmh of htn, dld, cva x4 w/ r hemiparesis, poor historian who presents to the ed c/o generalized weakness, loss of appetite and burning on urination, found to have a positive U/A     # UTI  - C/w Levaquin for now   - F/u Urine Cx & Blood Cx    # HO HTN / DLD / CVA  - C/w home meds    # DVTppx; Heparin  # DASH diet  # Full code  # Dispo: Pending clinical course, PT/Rehab.

## 2018-09-04 LAB
-  AMPICILLIN/SULBACTAM: SIGNIFICANT CHANGE UP
-  CEFAZOLIN: SIGNIFICANT CHANGE UP
-  GENTAMICIN: SIGNIFICANT CHANGE UP
-  OXACILLIN: SIGNIFICANT CHANGE UP
-  RIFAMPIN: SIGNIFICANT CHANGE UP
-  TETRACYCLINE: SIGNIFICANT CHANGE UP
-  TRIMETHOPRIM/SULFAMETHOXAZOLE: SIGNIFICANT CHANGE UP
-  VANCOMYCIN: SIGNIFICANT CHANGE UP
ALBUMIN SERPL ELPH-MCNC: 3.3 G/DL — LOW (ref 3.5–5.2)
ALP SERPL-CCNC: 131 U/L — HIGH (ref 30–115)
ALT FLD-CCNC: <5 U/L — SIGNIFICANT CHANGE UP (ref 0–41)
ANION GAP SERPL CALC-SCNC: 12 MMOL/L — SIGNIFICANT CHANGE UP (ref 7–14)
AST SERPL-CCNC: 14 U/L — SIGNIFICANT CHANGE UP (ref 0–41)
BASOPHILS # BLD AUTO: 0.02 K/UL — SIGNIFICANT CHANGE UP (ref 0–0.2)
BASOPHILS NFR BLD AUTO: 0.4 % — SIGNIFICANT CHANGE UP (ref 0–1)
BILIRUB DIRECT SERPL-MCNC: 0.2 MG/DL — SIGNIFICANT CHANGE UP (ref 0–0.2)
BILIRUB INDIRECT FLD-MCNC: 0.5 MG/DL — SIGNIFICANT CHANGE UP (ref 0.2–1.2)
BILIRUB SERPL-MCNC: 0.7 MG/DL — SIGNIFICANT CHANGE UP (ref 0.2–1.2)
BUN SERPL-MCNC: 8 MG/DL — LOW (ref 10–20)
CALCIUM SERPL-MCNC: 8.6 MG/DL — SIGNIFICANT CHANGE UP (ref 8.5–10.1)
CHLORIDE SERPL-SCNC: 102 MMOL/L — SIGNIFICANT CHANGE UP (ref 98–110)
CO2 SERPL-SCNC: 25 MMOL/L — SIGNIFICANT CHANGE UP (ref 17–32)
CREAT SERPL-MCNC: 0.9 MG/DL — SIGNIFICANT CHANGE UP (ref 0.7–1.5)
CULTURE RESULTS: SIGNIFICANT CHANGE UP
EOSINOPHIL # BLD AUTO: 0.07 K/UL — SIGNIFICANT CHANGE UP (ref 0–0.7)
EOSINOPHIL NFR BLD AUTO: 1.4 % — SIGNIFICANT CHANGE UP (ref 0–8)
GLUCOSE SERPL-MCNC: 99 MG/DL — SIGNIFICANT CHANGE UP (ref 70–99)
GRAM STN FLD: SIGNIFICANT CHANGE UP
HCT VFR BLD CALC: 35.9 % — LOW (ref 42–52)
HGB BLD-MCNC: 12.2 G/DL — LOW (ref 14–18)
IMM GRANULOCYTES NFR BLD AUTO: 0.2 % — SIGNIFICANT CHANGE UP (ref 0.1–0.3)
LYMPHOCYTES # BLD AUTO: 0.66 K/UL — LOW (ref 1.2–3.4)
LYMPHOCYTES # BLD AUTO: 13 % — LOW (ref 20.5–51.1)
MCHC RBC-ENTMCNC: 30.9 PG — SIGNIFICANT CHANGE UP (ref 27–31)
MCHC RBC-ENTMCNC: 34 G/DL — SIGNIFICANT CHANGE UP (ref 32–37)
MCV RBC AUTO: 90.9 FL — SIGNIFICANT CHANGE UP (ref 80–94)
METHOD TYPE: SIGNIFICANT CHANGE UP
MONOCYTES # BLD AUTO: 0.36 K/UL — SIGNIFICANT CHANGE UP (ref 0.1–0.6)
MONOCYTES NFR BLD AUTO: 7.1 % — SIGNIFICANT CHANGE UP (ref 1.7–9.3)
NEUTROPHILS # BLD AUTO: 3.96 K/UL — SIGNIFICANT CHANGE UP (ref 1.4–6.5)
NEUTROPHILS NFR BLD AUTO: 77.9 % — HIGH (ref 42.2–75.2)
NRBC # BLD: 0 /100 WBCS — SIGNIFICANT CHANGE UP (ref 0–0)
ORGANISM # SPEC MICROSCOPIC CNT: SIGNIFICANT CHANGE UP
ORGANISM # SPEC MICROSCOPIC CNT: SIGNIFICANT CHANGE UP
PLATELET # BLD AUTO: 163 K/UL — SIGNIFICANT CHANGE UP (ref 130–400)
POTASSIUM SERPL-MCNC: 4.8 MMOL/L — SIGNIFICANT CHANGE UP (ref 3.5–5)
POTASSIUM SERPL-SCNC: 4.8 MMOL/L — SIGNIFICANT CHANGE UP (ref 3.5–5)
PROT SERPL-MCNC: 5.7 G/DL — LOW (ref 6–8)
RBC # BLD: 3.95 M/UL — LOW (ref 4.7–6.1)
RBC # FLD: 13.1 % — SIGNIFICANT CHANGE UP (ref 11.5–14.5)
SODIUM SERPL-SCNC: 139 MMOL/L — SIGNIFICANT CHANGE UP (ref 135–146)
SPECIMEN SOURCE: SIGNIFICANT CHANGE UP
WBC # BLD: 5.08 K/UL — SIGNIFICANT CHANGE UP (ref 4.8–10.8)
WBC # FLD AUTO: 5.08 K/UL — SIGNIFICANT CHANGE UP (ref 4.8–10.8)

## 2018-09-04 PROCEDURE — 93970 EXTREMITY STUDY: CPT | Mod: 26

## 2018-09-04 RX ORDER — IPRATROPIUM/ALBUTEROL SULFATE 18-103MCG
3 AEROSOL WITH ADAPTER (GRAM) INHALATION EVERY 6 HOURS
Qty: 0 | Refills: 0 | Status: DISCONTINUED | OUTPATIENT
Start: 2018-09-04 | End: 2018-09-17

## 2018-09-04 RX ADMIN — HEPARIN SODIUM 5000 UNIT(S): 5000 INJECTION INTRAVENOUS; SUBCUTANEOUS at 06:13

## 2018-09-04 RX ADMIN — HEPARIN SODIUM 5000 UNIT(S): 5000 INJECTION INTRAVENOUS; SUBCUTANEOUS at 13:37

## 2018-09-04 RX ADMIN — GABAPENTIN 100 MILLIGRAM(S): 400 CAPSULE ORAL at 06:13

## 2018-09-04 RX ADMIN — Medication 0.1 MILLIGRAM(S): at 06:13

## 2018-09-04 RX ADMIN — CLOPIDOGREL BISULFATE 75 MILLIGRAM(S): 75 TABLET, FILM COATED ORAL at 11:22

## 2018-09-04 RX ADMIN — TAMSULOSIN HYDROCHLORIDE 0.4 MILLIGRAM(S): 0.4 CAPSULE ORAL at 22:00

## 2018-09-04 RX ADMIN — MAGNESIUM OXIDE 400 MG ORAL TABLET 400 MILLIGRAM(S): 241.3 TABLET ORAL at 11:22

## 2018-09-04 RX ADMIN — HEPARIN SODIUM 5000 UNIT(S): 5000 INJECTION INTRAVENOUS; SUBCUTANEOUS at 22:00

## 2018-09-04 RX ADMIN — Medication 81 MILLIGRAM(S): at 11:22

## 2018-09-04 RX ADMIN — Medication 0.1 MILLIGRAM(S): at 17:00

## 2018-09-04 RX ADMIN — ATORVASTATIN CALCIUM 20 MILLIGRAM(S): 80 TABLET, FILM COATED ORAL at 22:00

## 2018-09-04 RX ADMIN — GABAPENTIN 100 MILLIGRAM(S): 400 CAPSULE ORAL at 17:01

## 2018-09-04 NOTE — PROGRESS NOTE ADULT - ASSESSMENT
77 M w/ pmh of htn, dld, cva x4 w/ r hemiparesis, poor historian who presents to the ed c/o generalized weakness, loss of appetite and burning on urination, found to have a positive U/A     # Altered mental status, increased confusion : Encephalopathy likely secondary to UTI  - C/w Levaquin 750 mg IV q24h   - Blood culture negative   - Urine culture preliminary result : Staphylococcus aureus > 100 000 CFU / mL   - F/U Abdominal US results     # Bilateral lung masses on chest x ray ; not new   - Workup was refused in the past  - Will reach out to family regarding reason and wishes     # HO HTN / DLD / CVA  - C/w home meds : Aspirin, Plavix, Atorvastatin, Clonidine     # DVT prophylaxis : Heparin 5000 units q8h   # DASH diet  # Full code  # Disposition : Home 77 M w/ pmh of htn, dld, cva x4 w/ r hemiparesis, poor historian who presents to the ed c/o generalized weakness, loss of appetite and burning on urination, found to have a positive U/A     # Metabolic encephalopathy, increased confusion : Encephalopathy likely secondary to UTI  - C/w Levaquin 750 mg IV q24h   - Blood culture negative   - Urine culture preliminary result : Staphylococcus aureus > 100 000 CFU / mL   - F/U Abdominal US results     # Bilateral lung masses on chest x ray ; not new   - Workup was refused in the past  - Will reach out to family regarding reason and wishes     # HO HTN / DLD / CVA  - C/w home meds : Aspirin, Plavix, Atorvastatin, Clonidine     # DVT prophylaxis : Heparin 5000 units q8h   # DASH diet  # Full code  # Disposition : Home

## 2018-09-04 NOTE — PHYSICAL THERAPY INITIAL EVALUATION ADULT - GENERAL OBSERVATIONS, REHAB EVAL
11:18-11:40. Pt encountered transferring from chair to bed with 2 PCA's assist and was sitting at EOB. Pt agreeable to PT Eval but wanted to go back to bed stating he is not feeling well, stated he was not able to pinpoint where the discomfort was.

## 2018-09-04 NOTE — PROGRESS NOTE ADULT - SUBJECTIVE AND OBJECTIVE BOX
TRAV GOVEA 77y Male  MRN#: [id number]   CODE STATUS:________      SUBJECTIVE  77 M w/ pmh of htn, dld, cva x4 w/ r hemiparesis, poor historian who presents to the ed c/o generalized weakness & loss of appetite. For the past 4 days the pt has not been getting out of bed & has decreased PO intake which per the wife is unusual for him and is not his baseline. Pt denies SOB, chest pain, abd pain, n/v/d, urinary complaints. No fever.     On admission a CT scan of the had no contrast was done and showed stable moderate chronic microvascular ischemic changes and no evidence of acute intracranial pathology. A chest x ray was done and revealed diffuse bilateral lung masses without significant changes since last chest x ray. UA done on admission was positive and the patient was started on Levaquin.     Overnight  No major events overnight    Subjective  The patient doesn't have any complaints. The patient is not very cooperative     Present Today:   - No Odell catheter, no central line, no drains       OBJECTIVE  PAST MEDICAL & SURGICAL HISTORY  Hypertension  CVA (cerebral vascular accident)    ALLERGIES:  penicillin (Unknown)    MEDICATIONS:  STANDING MEDICATIONS  aspirin  chewable 81 milliGRAM(s) Oral daily  atorvastatin 20 milliGRAM(s) Oral at bedtime  cloNIDine 0.1 milliGRAM(s) Oral two times a day  clopidogrel Tablet 75 milliGRAM(s) Oral daily  gabapentin 100 milliGRAM(s) Oral two times a day  heparin  Injectable 5000 Unit(s) SubCutaneous every 8 hours  levoFLOXacin IVPB 750 milliGRAM(s) IV Intermittent every 24 hours  magnesium oxide 400 milliGRAM(s) Oral daily  tamsulosin 0.4 milliGRAM(s) Oral at bedtime    PRN MEDICATIONS      VITAL SIGNS: Last 24 Hours  T(C): 35.7 (04 Sep 2018 06:20), Max: 36.1 (03 Sep 2018 20:32)  T(F): 96.2 (04 Sep 2018 06:20), Max: 96.9 (03 Sep 2018 20:32)  HR: 69 (04 Sep 2018 06:20) (69 - 102)  BP: 138/93 (04 Sep 2018 06:20) (117/72 - 144/73)  BP(mean): --  RR: 18 (04 Sep 2018 06:20) (17 - 18)  SpO2: 96% (03 Sep 2018 20:32) (96% - 96%)    LABS:                        12.2   5.08  )-----------( 163      ( 04 Sep 2018 07:28 )             35.9         139  |  102  |  9<L>  ----------------------------<  90  5.1<H>   |  23  |  0.9    Ca    8.5      03 Sep 2018 05:44  Mg     1.9         TPro  6.9  /  Alb  4.1  /  TBili  0.7  /  DBili  x   /  AST  14  /  ALT  <5  /  AlkPhos  153<H>        Urinalysis Basic - ( 02 Sep 2018 16:12 )    Color: Yellow / Appearance: Cloudy / S.020 / pH: x  Gluc: x / Ketone: Trace  / Bili: Negative / Urobili: 1.0 mg/dL   Blood: x / Protein: 30 mg/dL / Nitrite: Positive   Leuk Esterase: Large / RBC: 25-50 /HPF / WBC >50 /HPF   Sq Epi: x / Non Sq Epi: Few /HPF / Bacteria: Many /HPF            Culture - Blood (collected 02 Sep 2018 16:12)  Source: .Blood Blood  Preliminary Report (03 Sep 2018 23:01):    No growth to date.    Culture - Blood (collected 02 Sep 2018 16:12)  Source: .Blood Blood  Preliminary Report (03 Sep 2018 23:01):    No growth to date.    Culture - Urine (collected 02 Sep 2018 16:12)  Source: .Urine Catheterized  Preliminary Report (03 Sep 2018 19:06):    >100,000 CFU/ml Staphylococcus aureus      CARDIAC MARKERS ( 02 Sep 2018 16:12 )  x     / <0.01 ng/mL / x     / x     / x          RADIOLOGY:    CT HEAD NO CONTRAST  No CT evidence for acute intracranial pathology.  Chronic infarctions in the left frontal, parietal, and anterior temporal   lobe and chronic lacunar infarct within the posterior limb of the right   basal ganglia.  Stable moderate chronic microvascular ischemic changes    CHEST X RAY  Diffuse bilateral lung masses without significant change          PHYSICAL EXAM:    GENERAL: No acute distress, afebrile and hemodynamically stable, not cooperative, awake and not oriented   HEENT:  Atraumatic, Normocephalic. EOMI, PERRLA, conjunctiva and sclera clear, No JVD  PULMONARY: Bilateral rhonchi audible   CARDIOVASCULAR: Heart sounds regular   GASTROINTESTINAL: Soft, nondistended   MUSCULOSKELETAL:  No lower extremity edema, positive peripheral pulses, Calves not erythematous/warm  NEUROLOGY: Mild weakness right upper and lower extremities   SKIN: No rashes or lesions TRAV GOVEA 77y Male  MRN#: [id number]   CODE STATUS:________      SUBJECTIVE  77 M w/ pmh of htn, dld, cva x4 w/ r hemiparesis, poor historian who presents to the ed c/o generalized weakness & loss of appetite. For the past 4 days the pt has not been getting out of bed & has decreased PO intake which per the wife is unusual for him and is not his baseline. Pt denies SOB, chest pain, abd pain, n/v/d, urinary complaints. No fever.     On admission a CT scan of the had no contrast was done and showed stable moderate chronic microvascular ischemic changes and no evidence of acute intracranial pathology. A chest x ray was done and revealed diffuse bilateral lung masses without significant changes since last chest x ray. UA done on admission was positive and the patient was started on Levaquin.     Overnight  No major events overnight    Subjective  The patient doesn't have any complaints. The patient is not very cooperative     Present Today:   - No Odell catheter, no central line, no drains       OBJECTIVE  PAST MEDICAL & SURGICAL HISTORY  Hypertension  CVA (cerebral vascular accident)    ALLERGIES:  penicillin (Unknown)    MEDICATIONS:  STANDING MEDICATIONS  aspirin  chewable 81 milliGRAM(s) Oral daily  atorvastatin 20 milliGRAM(s) Oral at bedtime  cloNIDine 0.1 milliGRAM(s) Oral two times a day  clopidogrel Tablet 75 milliGRAM(s) Oral daily  gabapentin 100 milliGRAM(s) Oral two times a day  heparin  Injectable 5000 Unit(s) SubCutaneous every 8 hours  levoFLOXacin IVPB 750 milliGRAM(s) IV Intermittent every 24 hours  magnesium oxide 400 milliGRAM(s) Oral daily  tamsulosin 0.4 milliGRAM(s) Oral at bedtime    PRN MEDICATIONS      VITAL SIGNS: Last 24 Hours  T(C): 35.7 (04 Sep 2018 06:20), Max: 36.1 (03 Sep 2018 20:32)  T(F): 96.2 (04 Sep 2018 06:20), Max: 96.9 (03 Sep 2018 20:32)  HR: 69 (04 Sep 2018 06:20) (69 - 102)  BP: 138/93 (04 Sep 2018 06:20) (117/72 - 144/73)  BP(mean): --  RR: 18 (04 Sep 2018 06:20) (17 - 18)  SpO2: 96% (03 Sep 2018 20:32) (96% - 96%)    LABS:                        12.2   5.08  )-----------( 163      ( 04 Sep 2018 07:28 )             35.9         139  |  102  |  9<L>  ----------------------------<  90  5.1<H>   |  23  |  0.9    Ca    8.5      03 Sep 2018 05:44  Mg     1.9         TPro  6.9  /  Alb  4.1  /  TBili  0.7  /  DBili  x   /  AST  14  /  ALT  <5  /  AlkPhos  153<H>        Urinalysis Basic - ( 02 Sep 2018 16:12 )    Color: Yellow / Appearance: Cloudy / S.020 / pH: x  Gluc: x / Ketone: Trace  / Bili: Negative / Urobili: 1.0 mg/dL   Blood: x / Protein: 30 mg/dL / Nitrite: Positive   Leuk Esterase: Large / RBC: 25-50 /HPF / WBC >50 /HPF   Sq Epi: x / Non Sq Epi: Few /HPF / Bacteria: Many /HPF            Culture - Blood (collected 02 Sep 2018 16:12)  Source: .Blood Blood  Preliminary Report (03 Sep 2018 23:01):    No growth to date.    Culture - Blood (collected 02 Sep 2018 16:12)  Source: .Blood Blood  Preliminary Report (03 Sep 2018 23:01):    No growth to date.    Culture - Urine (collected 02 Sep 2018 16:12)  Source: .Urine Catheterized  Preliminary Report (03 Sep 2018 19:06):    >100,000 CFU/ml Staphylococcus aureus      CARDIAC MARKERS ( 02 Sep 2018 16:12 )  x     / <0.01 ng/mL / x     / x     / x          RADIOLOGY:    CT HEAD NO CONTRAST  No CT evidence for acute intracranial pathology.  Chronic infarctions in the left frontal, parietal, and anterior temporal   lobe and chronic lacunar infarct within the posterior limb of the right   basal ganglia.  Stable moderate chronic microvascular ischemic changes    CHEST X RAY  Diffuse bilateral lung masses without significant change          PHYSICAL EXAM:    GENERAL: No acute distress, afebrile and hemodynamically stable, not cooperative, awake and not oriented   HEENT:  Atraumatic, Normocephalic. EOMI, PERRLA, conjunctiva and sclera clear, No JVD  PULMONARY: Bilateral rhonchi audible   CARDIOVASCULAR: Heart sounds regular   GASTROINTESTINAL: Soft, nondistended   MUSCULOSKELETAL:  No lower extremity edema, positive peripheral pulses, Calves not erythematous/warm  NEUROLOGY: Mild weakness right upper and lower extremities, AAOX1, 1:1 in room.    SKIN: No rashes or lesions

## 2018-09-04 NOTE — PHYSICAL THERAPY INITIAL EVALUATION ADULT - CRITERIA FOR SKILLED THERAPEUTIC INTERVENTIONS
impairments found/functional limitations in following categories/therapy frequency/risk reduction/prevention/predicted duration of therapy intervention/rehab potential

## 2018-09-05 ENCOUNTER — TRANSCRIPTION ENCOUNTER (OUTPATIENT)
Age: 78
End: 2018-09-05

## 2018-09-05 RX ORDER — IOHEXOL 300 MG/ML
30 INJECTION, SOLUTION INTRAVENOUS ONCE
Qty: 0 | Refills: 0 | Status: COMPLETED | OUTPATIENT
Start: 2018-09-05 | End: 2018-09-06

## 2018-09-05 RX ORDER — ENOXAPARIN SODIUM 100 MG/ML
65 INJECTION SUBCUTANEOUS
Qty: 0 | Refills: 0 | Status: DISCONTINUED | OUTPATIENT
Start: 2018-09-05 | End: 2018-09-17

## 2018-09-05 RX ORDER — VANCOMYCIN HCL 1 G
1000 VIAL (EA) INTRAVENOUS ONCE
Qty: 0 | Refills: 0 | Status: COMPLETED | OUTPATIENT
Start: 2018-09-05 | End: 2018-09-05

## 2018-09-05 RX ORDER — VANCOMYCIN HCL 1 G
1000 VIAL (EA) INTRAVENOUS EVERY 12 HOURS
Qty: 0 | Refills: 0 | Status: DISCONTINUED | OUTPATIENT
Start: 2018-09-05 | End: 2018-09-06

## 2018-09-05 RX ORDER — VANCOMYCIN HCL 1 G
VIAL (EA) INTRAVENOUS
Qty: 0 | Refills: 0 | Status: DISCONTINUED | OUTPATIENT
Start: 2018-09-05 | End: 2018-09-06

## 2018-09-05 RX ORDER — ENOXAPARIN SODIUM 100 MG/ML
63 INJECTION SUBCUTANEOUS
Qty: 0 | Refills: 0 | Status: DISCONTINUED | OUTPATIENT
Start: 2018-09-05 | End: 2018-09-05

## 2018-09-05 RX ORDER — VANCOMYCIN HCL 1 G
VIAL (EA) INTRAVENOUS
Qty: 0 | Refills: 0 | Status: DISCONTINUED | OUTPATIENT
Start: 2018-09-05 | End: 2018-09-05

## 2018-09-05 RX ADMIN — ENOXAPARIN SODIUM 65 MILLIGRAM(S): 100 INJECTION SUBCUTANEOUS at 18:11

## 2018-09-05 RX ADMIN — Medication 250 MILLIGRAM(S): at 17:19

## 2018-09-05 RX ADMIN — CLOPIDOGREL BISULFATE 75 MILLIGRAM(S): 75 TABLET, FILM COATED ORAL at 12:10

## 2018-09-05 RX ADMIN — Medication 250 MILLIGRAM(S): at 10:05

## 2018-09-05 RX ADMIN — HEPARIN SODIUM 5000 UNIT(S): 5000 INJECTION INTRAVENOUS; SUBCUTANEOUS at 05:51

## 2018-09-05 RX ADMIN — MAGNESIUM OXIDE 400 MG ORAL TABLET 400 MILLIGRAM(S): 241.3 TABLET ORAL at 12:10

## 2018-09-05 RX ADMIN — Medication 0.1 MILLIGRAM(S): at 05:51

## 2018-09-05 RX ADMIN — HEPARIN SODIUM 5000 UNIT(S): 5000 INJECTION INTRAVENOUS; SUBCUTANEOUS at 13:13

## 2018-09-05 RX ADMIN — GABAPENTIN 100 MILLIGRAM(S): 400 CAPSULE ORAL at 17:19

## 2018-09-05 RX ADMIN — ATORVASTATIN CALCIUM 20 MILLIGRAM(S): 80 TABLET, FILM COATED ORAL at 21:17

## 2018-09-05 RX ADMIN — GABAPENTIN 100 MILLIGRAM(S): 400 CAPSULE ORAL at 05:51

## 2018-09-05 RX ADMIN — TAMSULOSIN HYDROCHLORIDE 0.4 MILLIGRAM(S): 0.4 CAPSULE ORAL at 21:17

## 2018-09-05 RX ADMIN — Medication 81 MILLIGRAM(S): at 12:09

## 2018-09-05 NOTE — PROGRESS NOTE ADULT - SUBJECTIVE AND OBJECTIVE BOX
TRAV GOVEA 77y Male  MRN#: [id number]   CODE STATUS:________      SUBJECTIVE  77 M w/ pmh of htn, dld, cva x4 w/ r hemiparesis, poor historian who presents to the ed c/o generalized weakness & loss of appetite. For the past 4 days the pt has not been getting out of bed & has decreased PO intake which per the wife is unusual for him and is not his baseline. Pt denies SOB, chest pain, abd pain, n/v/d, urinary complaints. No fever.     On admission a CT scan of the had no contrast was done and showed stable moderate chronic microvascular ischemic changes and no evidence of acute intracranial pathology. A chest x ray was done and revealed diffuse bilateral lung masses without significant changes since last chest x ray. UA done on admission was positive and the patient was started on Levaquin.    Blood culture grew gram positive rods and the patient was switched to vancomycin 1 g IV q12h. The patient will also undergo workup for endocarditis and workup for malignancy as the chest x ray showed bilateral lung nodules (not new were present on previous x rays) and the wife is agreeable for workup.      Overnight  No major events overnight    Subjective  The patient doesn't have any complaints. He is not ambulating with variable PO intake     Present Today:   - No Odell catheter, no central line, no drains         OBJECTIVE  PAST MEDICAL & SURGICAL HISTORY  Hypertension  CVA (cerebral vascular accident)    ALLERGIES:  penicillin (Unknown)    MEDICATIONS:  STANDING MEDICATIONS  aspirin  chewable 81 milliGRAM(s) Oral daily  atorvastatin 20 milliGRAM(s) Oral at bedtime  cloNIDine 0.1 milliGRAM(s) Oral two times a day  clopidogrel Tablet 75 milliGRAM(s) Oral daily  gabapentin 100 milliGRAM(s) Oral two times a day  heparin  Injectable 5000 Unit(s) SubCutaneous every 8 hours  magnesium oxide 400 milliGRAM(s) Oral daily  tamsulosin 0.4 milliGRAM(s) Oral at bedtime  vancomycin  IVPB      vancomycin  IVPB 1000 milliGRAM(s) IV Intermittent once  vancomycin  IVPB 1000 milliGRAM(s) IV Intermittent every 12 hours    PRN MEDICATIONS  ALBUTerol/ipratropium for Nebulization 3 milliLiter(s) Nebulizer every 6 hours PRN      VITAL SIGNS: Last 24 Hours  T(C): 36.6 (05 Sep 2018 05:20), Max: 36.6 (05 Sep 2018 05:20)  T(F): 97.8 (05 Sep 2018 05:20), Max: 97.8 (05 Sep 2018 05:20)  HR: 69 (05 Sep 2018 05:20) (68 - 80)  BP: 96/58 (05 Sep 2018 05:20) (87/51 - 120/70)  BP(mean): --  RR: 18 (05 Sep 2018 05:20) (17 - 18)  SpO2: 97% (04 Sep 2018 16:41) (97% - 97%)    LABS:                        12.2   5.08  )-----------( 163      ( 04 Sep 2018 07:28 )             35.9     09-04    139  |  102  |  8<L>  ----------------------------<  99  4.8   |  25  |  0.9    Ca    8.6      04 Sep 2018 07:28    TPro  5.7<L>  /  Alb  3.3<L>  /  TBili  0.7  /  DBili  0.2  /  AST  14  /  ALT  <5  /  AlkPhos  131<H>  09-04              Culture - Blood (collected 03 Sep 2018 05:44)  Source: .Blood None  Gram Stain (04 Sep 2018 21:44):    Growth in anaerobic bottle: Gram Positive Rods  Preliminary Report (04 Sep 2018 21:45):    Growth in anaerobic bottle: Gram Positive Rods    Culture - Blood (collected 02 Sep 2018 16:12)  Source: .Blood Blood  Preliminary Report (03 Sep 2018 23:01):    No growth to date.    Culture - Blood (collected 02 Sep 2018 16:12)  Source: .Blood Blood  Preliminary Report (03 Sep 2018 23:01):    No growth to date.    Culture - Urine (collected 02 Sep 2018 16:12)  Source: .Urine Catheterized  Final Report (04 Sep 2018 19:15):    >100,000 CFU/ml Staphylococcus aureus  Organism: Staphylococcus aureus (04 Sep 2018 19:15)  Organism: Staphylococcus aureus (04 Sep 2018 19:15)          RADIOLOGY:    CT HEAD NO CONTRAST  No CT evidence for acute intracranial pathology.  Chronic infarctions in the left frontal, parietal, and anterior temporal   lobe and chronic lacunar infarct within the posterior limb of the right   basal ganglia.  Stable moderate chronic microvascular ischemic changes    CHEST X RAY  Diffuse bilateral lung masses without significant change          PHYSICAL EXAM:    GENERAL: No acute distress, afebrile, BP borderline at baseline. Oriented only to people. Not agitated    HEENT:  Sclera clear, mucosa moist, no cervical lymphadenopathy  PULMONARY: scattered bilateral rhonchi  CARDIOVASCULAR: Heart sounds regular   GASTROINTESTINAL: Soft, nondistended, bowel sounds audible   MUSCULOSKELETAL:  No lower extremity edema, positive peripheral pulses, Calves not erythematous/warm  NEUROLOGY: Mild weakness right upper and lower extremities TRAV GOVEA 77y Male  MRN#: [id number]   CODE STATUS:________      SUBJECTIVE  77 M w/ pmh of htn, dld, cva x4 w/ r hemiparesis, poor historian who presents to the ed c/o generalized weakness & loss of appetite. For the past 4 days the pt has not been getting out of bed & has decreased PO intake which per the wife is unusual for him and is not his baseline. Pt denies SOB, chest pain, abd pain, n/v/d, urinary complaints. No fever.     On admission a CT scan of the had no contrast was done and showed stable moderate chronic microvascular ischemic changes and no evidence of acute intracranial pathology. A chest x ray was done and revealed diffuse bilateral lung masses without significant changes since last chest x ray. UA done on admission was positive and the patient was started on Levaquin.    Blood culture grew gram positive rods and the patient was switched to vancomycin 1 g IV q12h. The patient will also undergo workup for endocarditis and workup for malignancy as the chest x ray showed bilateral lung nodules (not new were present on previous x rays) and the wife is agreeable for workup.      Overnight  No major events overnight    Subjective  The patient doesn't have any complaints. He is not ambulating with variable PO intake   no cp, sob, abd pain, fever.    Present Today:   - No Odell catheter, no central line, no drains         OBJECTIVE  PAST MEDICAL & SURGICAL HISTORY  Hypertension  CVA (cerebral vascular accident)    ALLERGIES:  penicillin (Unknown)    MEDICATIONS:  STANDING MEDICATIONS  aspirin  chewable 81 milliGRAM(s) Oral daily  atorvastatin 20 milliGRAM(s) Oral at bedtime  cloNIDine 0.1 milliGRAM(s) Oral two times a day  clopidogrel Tablet 75 milliGRAM(s) Oral daily  gabapentin 100 milliGRAM(s) Oral two times a day  heparin  Injectable 5000 Unit(s) SubCutaneous every 8 hours  magnesium oxide 400 milliGRAM(s) Oral daily  tamsulosin 0.4 milliGRAM(s) Oral at bedtime  vancomycin  IVPB      vancomycin  IVPB 1000 milliGRAM(s) IV Intermittent once  vancomycin  IVPB 1000 milliGRAM(s) IV Intermittent every 12 hours    PRN MEDICATIONS  ALBUTerol/ipratropium for Nebulization 3 milliLiter(s) Nebulizer every 6 hours PRN      VITAL SIGNS: Last 24 Hours  T(C): 36.6 (05 Sep 2018 05:20), Max: 36.6 (05 Sep 2018 05:20)  T(F): 97.8 (05 Sep 2018 05:20), Max: 97.8 (05 Sep 2018 05:20)  HR: 69 (05 Sep 2018 05:20) (68 - 80)  BP: 96/58 (05 Sep 2018 05:20) (87/51 - 120/70)  BP(mean): --  RR: 18 (05 Sep 2018 05:20) (17 - 18)  SpO2: 97% (04 Sep 2018 16:41) (97% - 97%)    LABS:                        12.2   5.08  )-----------( 163      ( 04 Sep 2018 07:28 )             35.9     09-04    139  |  102  |  8<L>  ----------------------------<  99  4.8   |  25  |  0.9    Ca    8.6      04 Sep 2018 07:28    TPro  5.7<L>  /  Alb  3.3<L>  /  TBili  0.7  /  DBili  0.2  /  AST  14  /  ALT  <5  /  AlkPhos  131<H>  09-04              Culture - Blood (collected 03 Sep 2018 05:44)  Source: .Blood None  Gram Stain (04 Sep 2018 21:44):    Growth in anaerobic bottle: Gram Positive Rods  Preliminary Report (04 Sep 2018 21:45):    Growth in anaerobic bottle: Gram Positive Rods    Culture - Blood (collected 02 Sep 2018 16:12)  Source: .Blood Blood  Preliminary Report (03 Sep 2018 23:01):    No growth to date.    Culture - Blood (collected 02 Sep 2018 16:12)  Source: .Blood Blood  Preliminary Report (03 Sep 2018 23:01):    No growth to date.    Culture - Urine (collected 02 Sep 2018 16:12)  Source: .Urine Catheterized  Final Report (04 Sep 2018 19:15):    >100,000 CFU/ml Staphylococcus aureus  Organism: Staphylococcus aureus (04 Sep 2018 19:15)  Organism: Staphylococcus aureus (04 Sep 2018 19:15)          RADIOLOGY:    CT HEAD NO CONTRAST  No CT evidence for acute intracranial pathology.  Chronic infarctions in the left frontal, parietal, and anterior temporal   lobe and chronic lacunar infarct within the posterior limb of the right   basal ganglia.  Stable moderate chronic microvascular ischemic changes    CHEST X RAY  Diffuse bilateral lung masses without significant change          PHYSICAL EXAM:    GENERAL: No acute distress, afebrile, BP borderline at baseline. Oriented only to people. Not agitated    HEENT:  Sclera clear, mucosa moist, no cervical lymphadenopathy  PULMONARY: scattered bilateral rhonchi  CARDIOVASCULAR: Heart sounds regular   GASTROINTESTINAL: Soft, nondistended, bowel sounds audible   MUSCULOSKELETAL:  No lower extremity edema, positive peripheral pulses, Calves not erythematous/warm  NEUROLOGY: Mild weakness right upper and lower extremities

## 2018-09-05 NOTE — DISCHARGE NOTE ADULT - HOSPITAL COURSE
77 M w/ pmh of htn, dld, cva x4 w/ r hemiparesis, poor historian who presents to the ed c/o generalized weakness & loss of appetite. For the past 4 days the pt has not been getting out of bed & has decreased PO intake which per the wife is unusual for him and is not his baseline.  The patient found to have multiple lung nodules, highly suspicious of malignancy 77 M w/ pmh of htn, dld, cva x4 w/ r hemiparesis, poor historian who presents to the ed c/o generalized weakness & loss of appetite. For the past 4 days the pt has not been getting out of bed & has decreased PO intake which per the wife is unusual for him and is not his baseline.  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 Family opted hospice.   Although patient was noticed to have an acute DVT here and was kept on anticoagulation while here, Hospice cannot do lovenox at home. Hence will D/c anticoagulation since it will not bring him comfort hence anticoagulation is not in line with hospice goals. 77 M w/ pmh of htn, dld, cva x4 w/ r hemiparesis, poor historian who presents to the ed c/o generalized weakness & loss of appetite. For the past 4 days the pt has not been getting out of bed & has decreased PO intake which per the wife is unusual for him and is not his baseline.  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 Family opted hospice.   Although patient was noticed to have an acute DVT here and was kept on anticoagulation while here, Hospice cannot do lovenox at home. Hence will D/c anticoagulation since it will not bring him comfort hence anticoagulation is not in line with hospice goals.

## 2018-09-05 NOTE — DISCHARGE NOTE ADULT - PLAN OF CARE
diagnosis the patient found to have multiple lung nodules, with very high suspicion of malignancy, family refused diagnostic procedure, decided to start the patient on Hospice care. treatment and prevention of complications the patient was diagnosed with left popliteal vein DVT, started on enoxaparin 65 mg, will need to continue anticoagulation lifelong, the patient was diagnosed with left popliteal vein DVT, started on enoxaparin 65 mg, D/C upon discharge as per hospice care

## 2018-09-05 NOTE — DISCHARGE NOTE ADULT - MEDICATION SUMMARY - MEDICATIONS TO TAKE
I will START or STAY ON the medications listed below when I get home from the hospital:    aspirin 81 mg oral tablet, chewable  -- 1 tab(s) by mouth once a day  -- Indication: For [facility]    cloNIDine 0.1 mg oral tablet  -- 1 tab(s) by mouth 2 times a day  -- Indication: For [facility]    tamsulosin 0.4 mg oral capsule  -- 1 cap(s) by mouth once a day  -- Indication: For [facility]    enoxaparin  -- 65 milligram(s) subcutaneous 2 times a day, lifelong anticoagulation/ DVT secondary to makignancy.  -- Indication: For [facility]    gabapentin 100 mg oral capsule  -- 2 cap(s) by mouth once a day  -- Indication: For [facility]    atorvastatin 20 mg oral tablet  -- 1 tab(s) by mouth once a day (at bedtime)  -- Indication: For [facility]    Plavix 75 mg oral tablet  -- 1 tab(s) by mouth once a day  -- Indication: For [facility]    magnesium oxide 400 mg (240 mg elemental magnesium) oral tablet  -- 1 tab(s) by mouth once a day  -- Indication: For [facility]

## 2018-09-05 NOTE — DISCHARGE NOTE ADULT - PATIENT PORTAL LINK FT
You can access the [facility], offered by [facility], by registering with the following website: [email or web address]

## 2018-09-05 NOTE — DISCHARGE NOTE ADULT - CARE PLAN
Principal Discharge DX:	Lung nodules  Goal:	diagnosis  Assessment and plan of treatment:	the patient found to have multiple lung nodules, with very high suspicion of malignancy, family refused diagnostic procedure, decided to start the patient on Hospice care.  Secondary Diagnosis:	Deep vein thrombosis (DVT) of other vein of left lower extremity  Goal:	treatment and prevention of complications  Assessment and plan of treatment:	the patient was diagnosed with left popliteal vein DVT, started on enoxaparin 65 mg, will need to continue anticoagulation lifelong, Principal Discharge DX:	Lung nodules  Goal:	diagnosis  Assessment and plan of treatment:	the patient found to have multiple lung nodules, with very high suspicion of malignancy, family refused diagnostic procedure, decided to start the patient on Hospice care.  Secondary Diagnosis:	Deep vein thrombosis (DVT) of other vein of left lower extremity  Goal:	treatment and prevention of complications  Assessment and plan of treatment:	the patient was diagnosed with left popliteal vein DVT, started on enoxaparin 65 mg, D/C upon discharge as per hospice care

## 2018-09-06 LAB
ALBUMIN SERPL ELPH-MCNC: 3.3 G/DL — LOW (ref 3.5–5.2)
ALP SERPL-CCNC: 157 U/L — HIGH (ref 30–115)
ALT FLD-CCNC: 11 U/L — SIGNIFICANT CHANGE UP (ref 0–41)
ANION GAP SERPL CALC-SCNC: 13 MMOL/L — SIGNIFICANT CHANGE UP (ref 7–14)
AST SERPL-CCNC: 37 U/L — SIGNIFICANT CHANGE UP (ref 0–41)
BILIRUB SERPL-MCNC: 0.8 MG/DL — SIGNIFICANT CHANGE UP (ref 0.2–1.2)
BUN SERPL-MCNC: 9 MG/DL — LOW (ref 10–20)
CALCIUM SERPL-MCNC: 8.3 MG/DL — LOW (ref 8.5–10.1)
CHLORIDE SERPL-SCNC: 103 MMOL/L — SIGNIFICANT CHANGE UP (ref 98–110)
CO2 SERPL-SCNC: 23 MMOL/L — SIGNIFICANT CHANGE UP (ref 17–32)
CREAT SERPL-MCNC: 0.8 MG/DL — SIGNIFICANT CHANGE UP (ref 0.7–1.5)
CULTURE RESULTS: SIGNIFICANT CHANGE UP
GLUCOSE SERPL-MCNC: 110 MG/DL — HIGH (ref 70–99)
HCT VFR BLD CALC: 35.9 % — LOW (ref 42–52)
HGB BLD-MCNC: 11.9 G/DL — LOW (ref 14–18)
MCHC RBC-ENTMCNC: 31 PG — SIGNIFICANT CHANGE UP (ref 27–31)
MCHC RBC-ENTMCNC: 33.1 G/DL — SIGNIFICANT CHANGE UP (ref 32–37)
MCV RBC AUTO: 93.5 FL — SIGNIFICANT CHANGE UP (ref 80–94)
NRBC # BLD: 0 /100 WBCS — SIGNIFICANT CHANGE UP (ref 0–0)
PLATELET # BLD AUTO: 175 K/UL — SIGNIFICANT CHANGE UP (ref 130–400)
POTASSIUM SERPL-MCNC: 4.4 MMOL/L — SIGNIFICANT CHANGE UP (ref 3.5–5)
POTASSIUM SERPL-SCNC: 4.4 MMOL/L — SIGNIFICANT CHANGE UP (ref 3.5–5)
PROT SERPL-MCNC: 5.7 G/DL — LOW (ref 6–8)
RBC # BLD: 3.84 M/UL — LOW (ref 4.7–6.1)
RBC # FLD: 13.5 % — SIGNIFICANT CHANGE UP (ref 11.5–14.5)
SODIUM SERPL-SCNC: 139 MMOL/L — SIGNIFICANT CHANGE UP (ref 135–146)
SPECIMEN SOURCE: SIGNIFICANT CHANGE UP
WBC # BLD: 5.3 K/UL — SIGNIFICANT CHANGE UP (ref 4.8–10.8)
WBC # FLD AUTO: 5.3 K/UL — SIGNIFICANT CHANGE UP (ref 4.8–10.8)

## 2018-09-06 RX ADMIN — MAGNESIUM OXIDE 400 MG ORAL TABLET 400 MILLIGRAM(S): 241.3 TABLET ORAL at 11:29

## 2018-09-06 RX ADMIN — GABAPENTIN 100 MILLIGRAM(S): 400 CAPSULE ORAL at 17:39

## 2018-09-06 RX ADMIN — GABAPENTIN 100 MILLIGRAM(S): 400 CAPSULE ORAL at 05:30

## 2018-09-06 RX ADMIN — ATORVASTATIN CALCIUM 20 MILLIGRAM(S): 80 TABLET, FILM COATED ORAL at 21:08

## 2018-09-06 RX ADMIN — Medication 250 MILLIGRAM(S): at 05:31

## 2018-09-06 RX ADMIN — Medication 0.1 MILLIGRAM(S): at 05:30

## 2018-09-06 RX ADMIN — ENOXAPARIN SODIUM 65 MILLIGRAM(S): 100 INJECTION SUBCUTANEOUS at 17:38

## 2018-09-06 RX ADMIN — IOHEXOL 30 MILLILITER(S): 300 INJECTION, SOLUTION INTRAVENOUS at 06:38

## 2018-09-06 RX ADMIN — ENOXAPARIN SODIUM 65 MILLIGRAM(S): 100 INJECTION SUBCUTANEOUS at 05:31

## 2018-09-06 RX ADMIN — Medication 81 MILLIGRAM(S): at 11:29

## 2018-09-06 RX ADMIN — Medication 0.1 MILLIGRAM(S): at 17:40

## 2018-09-06 RX ADMIN — CLOPIDOGREL BISULFATE 75 MILLIGRAM(S): 75 TABLET, FILM COATED ORAL at 11:29

## 2018-09-06 RX ADMIN — TAMSULOSIN HYDROCHLORIDE 0.4 MILLIGRAM(S): 0.4 CAPSULE ORAL at 21:08

## 2018-09-06 NOTE — PROGRESS NOTE ADULT - ASSESSMENT
77 M w/ pmh of htn, dld, cva x4 w/ r hemiparesis, poor historian who presents to the ed c/o generalized weakness, loss of appetite and burning on urination, found to have a positive U/A     # Metabolic encephalopathy, increased confusion : Encephalopathy likely secondary to UTI  - Blood culture on 09/03 positive for Gram positive rods. Organism not yet identidied  - TTE performed. Results pending  - Vancomycin 1g IV q12h   - ID consult : Advised discontinuing the antibiotics pending identification of the gram positive mukesh    - Urine culture : > 100 000 CFU / mL MARCIE   - Abdominal ultrasound showed cholelithiasis with few echogenic foci nonmobile and no sonographic evidence of cholecystitis     # Bilateral lung masses on chest x ray ; not new   - Wife agreeable for workup  - CT chest w/IV contrast + CT abdomen and pelvis w/oral and IV contrast performed : The CT scan of the chest showing increase number and size of the pulmonary nodules since   August 7, 2017  MRI : PATIENT HAS DIFFUSE BODY METAL PIECES FROM [place]    # HO HTN / DLD / CVA  - C/w home meds : Aspirin, Plavix, Atorvastatin, Clonidine     # DVT prophylaxis : Heparin 5000 units q8h   # Diet : DASH diet   # Full code  # Activity : Increase as tolerated   # Disposition : Home. Lives with wife 77 M w/ pmh of htn, dld, cva x4 w/ r hemiparesis, poor historian who presents to the ed c/o generalized weakness, loss of appetite and burning on urination, found to have a positive U/A     # Metabolic encephalopathy, increased confusion : Encephalopathy likely secondary to UTI  - Blood culture on 09/03 positive for Gram positive rods. Organism not yet identidied  - TTE performed. Results pending  - Vancomycin 1g IV q12h   - ID consult : Advised discontinuing the antibiotics pending identification of the gram positive mukesh    - Urine culture : > 100 000 CFU / mL MARCIE   - Abdominal ultrasound showed cholelithiasis with few echogenic foci nonmobile and no sonographic evidence of cholecystitis     # Bilateral lung masses on chest x ray ; not new   - Wife agreeable for workup  - CT chest w/IV contrast + CT abdomen and pelvis w/oral and IV contrast performed : The CT scan of the chest showing increase number and size of the pulmonary nodules since   August 7, 2017  MRI : PATIENT HAS DIFFUSE BODY METAL PIECES FROM [place]    # HO HTN / DLD / CVA  - C/w home meds : Aspirin, Plavix, Atorvastatin, Clonidine     # DVT   - VA duplex of the lower extremities showed Rderick appearing DVT in the left popliteal vein. Right thigh mass measuring 2 x 2 centimeters second mass measuring 2 x 3 cm  - Patient was started on therapeutic lovenox     # Diet : DASH diet   # Full code  # Activity : Increase as tolerated   # Disposition : Home. Lives with wife 77 M w/ pmh of htn, dld, cva x4 w/ r hemiparesis, poor historian who presents to the ed c/o generalized weakness, loss of appetite and burning on urination, found to have a positive U/A     # Metabolic encephalopathy, increased confusion : Encephalopathy likely secondary to UTI  - Blood culture on 09/03 positive for Gram positive rods. Organism not yet identidied  - TTE performed. Results pending  - Vancomycin 1g IV q12h   - ID consult : Advised discontinuing the antibiotics pending identification of the gram positive mukesh    - Urine culture : > 100 000 CFU / mL MARCIE   - Abdominal ultrasound showed cholelithiasis with few echogenic foci nonmobile and no sonographic evidence of cholecystitis     # Bilateral lung masses on chest x ray ; not new   - Wife agreeable for workup  - CT chest w/IV contrast + CT abdomen and pelvis w/oral and IV contrast performed : The CT scan of the chest showing increase number and size of the pulmonary nodules since   August 7, 2017  MRI : PATIENT HAS DIFFUSE BODY METAL PIECES FROM [place]    # HO HTN / DLD / CVA  - C/w home meds : Aspirin, Plavix, Atorvastatin, Clonidine     # DVT   - VA duplex of the lower extremities showed Mynor appearing DVT in the left popliteal vein. Right thigh mass measuring 2 x 2 centimeters second mass measuring 2 x 3 cm  - Patient was started on therapeutic Lovenox : 65 mg sc BID      # Diet : DASH diet   # Full code  # Activity : Increase as tolerated   # Disposition : Home. Lives with wife

## 2018-09-06 NOTE — PROGRESS NOTE ADULT - SUBJECTIVE AND OBJECTIVE BOX
TRAV GOVEA  77y  Male      Patient is a 77y old  Male who presents with a chief complaint of UTI (05 Sep 2018 12:47)      INTERVAL HPI/OVERNIGHT EVENTS:  no cp, sob, abd pain, fever.    Vital Signs Last 24 Hrs  T(C): 35.9 (06 Sep 2018 12:20), Max: 36.9 (05 Sep 2018 20:24)  T(F): 96.7 (06 Sep 2018 12:20), Max: 98.4 (05 Sep 2018 20:24)  HR: 80 (06 Sep 2018 12:20) (70 - 90)  BP: 104/56 (06 Sep 2018 12:20) (90/54 - 112/57)  BP(mean): --  RR: 16 (06 Sep 2018 12:20) (16 - 17)  SpO2: --    PHYSICAL EXAM:  GENERAL: NAD, well-groomed, well-developed  HEAD:  Atraumatic, Normocephalic    CHEST/LUNG: diffuse crackles  HEART: Regular rate and rhythm; No murmurs, rubs, or gallops  ABDOMEN: Soft, Nontender, Nondistended; Bowel sounds present  EXTREMITIES:  2+ Peripheral Pulses, No clubbing, cyanosis, or edema      Consultant(s) Notes Reviewed:  [x ] YES  [ ] NO  Care Discussed with Consultants/Other Providers [ x] YES  [ ] NO    LABS:                        11.9   5.30  )-----------( 175      ( 06 Sep 2018 12:20 )             35.9     09-06    139  |  103  |  9<L>  ----------------------------<  110<H>  4.4   |  23  |  0.8    Ca    8.3<L>      06 Sep 2018 12:20    TPro  5.7<L>  /  Alb  3.3<L>  /  TBili  0.8  /  DBili  x   /  AST  37  /  ALT  11  /  AlkPhos  157<H>  09-06          CAPILLARY BLOOD GLUCOSE          RADIOLOGY & ADDITIONAL TESTS:    Imaging Personally Reviewed:  [ ] YES  [ ] NO

## 2018-09-06 NOTE — PROGRESS NOTE ADULT - SUBJECTIVE AND OBJECTIVE BOX
TRAV GOVEA 77y Male  MRN#: [id number]   CODE STATUS:________      SUBJECTIVE  77 M w/ pmh of htn, dld, cva x4 w/ r hemiparesis, poor historian who presents to the ed c/o generalized weakness & loss of appetite. For the past 4 days the pt has not been getting out of bed & has decreased PO intake which per the wife is unusual for him and is not his baseline. Pt denies SOB, chest pain, abd pain, n/v/d, urinary complaints. No fever.     On admission a CT scan of the had no contrast was done and showed stable moderate chronic microvascular ischemic changes and no evidence of acute intracranial pathology. A chest x ray was done and revealed diffuse bilateral lung masses without significant changes since last chest x ray. UA done on admission was positive and the patient was started on Levaquin.    Blood culture grew gram positive rods and the patient was switched to vancomycin 1 g IV q12h. The patient will also undergo workup for endocarditis and workup for malignancy as the chest x ray showed bilateral lung nodules (not new were present on previous x rays) and the wife is agreeable for workup.      Overnight  No major events overnight    Subjective  The patient doesn't have any complaints, he is out of bed to chair and eating     Present Today:   - No Odell catheter, no central line, no drains         OBJECTIVE  PAST MEDICAL & SURGICAL HISTORY  Hypertension  CVA (cerebral vascular accident)    ALLERGIES:  penicillin (Unknown)    MEDICATIONS:  STANDING MEDICATIONS  aspirin  chewable 81 milliGRAM(s) Oral daily  atorvastatin 20 milliGRAM(s) Oral at bedtime  cloNIDine 0.1 milliGRAM(s) Oral two times a day  clopidogrel Tablet 75 milliGRAM(s) Oral daily  enoxaparin Injectable 65 milliGRAM(s) SubCutaneous two times a day  gabapentin 100 milliGRAM(s) Oral two times a day  magnesium oxide 400 milliGRAM(s) Oral daily  tamsulosin 0.4 milliGRAM(s) Oral at bedtime    PRN MEDICATIONS  ALBUTerol/ipratropium for Nebulization 3 milliLiter(s) Nebulizer every 6 hours PRN      VITAL SIGNS: Last 24 Hours  T(C): 35.9 (06 Sep 2018 12:20), Max: 36.9 (05 Sep 2018 20:24)  T(F): 96.7 (06 Sep 2018 12:20), Max: 98.4 (05 Sep 2018 20:24)  HR: 80 (06 Sep 2018 12:20) (70 - 90)  BP: 104/56 (06 Sep 2018 12:20) (90/54 - 112/57)  BP(mean): --  RR: 16 (06 Sep 2018 12:20) (16 - 17)  SpO2: --    LABS:                        11.9   5.30  )-----------( 175      ( 06 Sep 2018 12:20 )             35.9     09-06    139  |  103  |  9<L>  ----------------------------<  110<H>  4.4   |  23  |  0.8    Ca    8.3<L>      06 Sep 2018 12:20    TPro  5.7<L>  /  Alb  3.3<L>  /  TBili  0.8  /  DBili  x   /  AST  37  /  ALT  11  /  AlkPhos  157<H>  09-06    RADIOLOGY:    CT HEAD NO CONTRAST  No CT evidence for acute intracranial pathology.  Chronic infarctions in the left frontal, parietal, and anterior temporal   lobe and chronic lacunar infarct within the posterior limb of the right   basal ganglia.  Stable moderate chronic microvascular ischemic changes    CHEST X RAY  Diffuse bilateral lung masses without significant change    CT CHEST, ABDOMEN AND PELVIS  Since August 7, 2017, increased number and size of a bilateral pulmonary   nodules and masses, largest within the right upper lobe measuring 4.9 x   4.3 cm, consistent with worsening malignant disease.      PHYSICAL EXAM:    GENERAL: No acute distress, afebrile, BP borderline at baseline. Oriented only to people. Not agitated    HEENT:  Sclera mildly injected, mucosa moist  PULMONARY: scattered bilateral rhonchi  CARDIOVASCULAR: Heart sounds regular   GASTROINTESTINAL: Soft, nondistended, bowel sounds audible   MUSCULOSKELETAL:  No lower extremity edema, positive peripheral pulses, right calf warmer on palpation than left calf, not edematous and not erythematous   NEUROLOGY: Mild weakness right upper and lower extremities TRAV GOVEA 77y Male  MRN#: [id number]   CODE STATUS:________      SUBJECTIVE  77 M w/ pmh of htn, dld, cva x4 w/ r hemiparesis, poor historian who presents to the ed c/o generalized weakness & loss of appetite. For the past 4 days the pt has not been getting out of bed & has decreased PO intake which per the wife is unusual for him and is not his baseline. Pt denies SOB, chest pain, abd pain, n/v/d, urinary complaints. No fever.     On admission a CT scan of the had no contrast was done and showed stable moderate chronic microvascular ischemic changes and no evidence of acute intracranial pathology. A chest x ray was done and revealed diffuse bilateral lung masses without significant changes since last chest x ray. UA done on admission was positive and the patient was started on Levaquin.    Blood culture grew gram positive rods and the patient was switched to vancomycin 1 g IV q12h. The patient will also undergo workup for endocarditis and workup for malignancy as the chest x ray showed bilateral lung nodules (not new were present on previous x rays) and the wife is agreeable for workup.      Overnight  No major events overnight    Subjective  The patient doesn't have any complaints, he is out of bed to chair and eating     Present Today:   - No Odell catheter, no central line, no drains         OBJECTIVE  PAST MEDICAL & SURGICAL HISTORY  Hypertension  CVA (cerebral vascular accident)    ALLERGIES:  penicillin (Unknown)    MEDICATIONS:  STANDING MEDICATIONS  aspirin  chewable 81 milliGRAM(s) Oral daily  atorvastatin 20 milliGRAM(s) Oral at bedtime  cloNIDine 0.1 milliGRAM(s) Oral two times a day  clopidogrel Tablet 75 milliGRAM(s) Oral daily  enoxaparin Injectable 65 milliGRAM(s) SubCutaneous two times a day  gabapentin 100 milliGRAM(s) Oral two times a day  magnesium oxide 400 milliGRAM(s) Oral daily  tamsulosin 0.4 milliGRAM(s) Oral at bedtime    PRN MEDICATIONS  ALBUTerol/ipratropium for Nebulization 3 milliLiter(s) Nebulizer every 6 hours PRN      VITAL SIGNS: Last 24 Hours  T(C): 35.9 (06 Sep 2018 12:20), Max: 36.9 (05 Sep 2018 20:24)  T(F): 96.7 (06 Sep 2018 12:20), Max: 98.4 (05 Sep 2018 20:24)  HR: 80 (06 Sep 2018 12:20) (70 - 90)  BP: 104/56 (06 Sep 2018 12:20) (90/54 - 112/57)  BP(mean): --  RR: 16 (06 Sep 2018 12:20) (16 - 17)  SpO2: --    LABS:                        11.9   5.30  )-----------( 175      ( 06 Sep 2018 12:20 )             35.9     09-06    139  |  103  |  9<L>  ----------------------------<  110<H>  4.4   |  23  |  0.8    Ca    8.3<L>      06 Sep 2018 12:20    TPro  5.7<L>  /  Alb  3.3<L>  /  TBili  0.8  /  DBili  x   /  AST  37  /  ALT  11  /  AlkPhos  157<H>  09-06    RADIOLOGY:    CT HEAD NO CONTRAST  No CT evidence for acute intracranial pathology.  Chronic infarctions in the left frontal, parietal, and anterior temporal   lobe and chronic lacunar infarct within the posterior limb of the right   basal ganglia.  Stable moderate chronic microvascular ischemic changes    CHEST X RAY  Diffuse bilateral lung masses without significant change    CT CHEST, ABDOMEN AND PELVIS  Since August 7, 2017, increased number and size of a bilateral pulmonary   nodules and masses, largest within the right upper lobe measuring 4.9 x   4.3 cm, consistent with worsening malignant disease.      PHYSICAL EXAM:    GENERAL: No acute distress, afebrile, BP borderline at baseline. Oriented only to people. Not agitated    HEENT:  Sclera mildly injected, mucosa moist  PULMONARY: scattered bilateral rhonchi  CARDIOVASCULAR: Heart sounds regular   GASTROINTESTINAL: Soft, nondistended, bowel sounds audible   MUSCULOSKELETAL:  No lower extremity edema, positive peripheral pulses.   NEUROLOGY: Mild weakness right upper and lower extremities

## 2018-09-06 NOTE — CONSULT NOTE ADULT - ASSESSMENT
IMPRESSION:  Primary problem seems to be a progressive neoplastic process with CXR form 2014 being abnormal and scince then has progressed with multipe large diffuse masses nodular in the lungs bilaterally.  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  Asumptomatic bacturia with MARCIE with no evidence of endocarditis.  Blood cultures contaminated with gram positive rods.  No evidence of sepsis with encephalopathy    RECOMMENDATIONS:  D/C antibiotics  F/U identification of the gram positive rods.  Neoplastic work up.   CT head to rule out mets to the brain

## 2018-09-06 NOTE — PROGRESS NOTE ADULT - ASSESSMENT
77M PMHx HTN, CVA with hemiparesis here with metabolic encephalopathy. Lung mass with suspected metastasis. Gram pos mukesh bacteremia. UTI MSSA.    1. Encephalopathy  unclear etiology  in setting of bacteremia and uti and suspected underlying malignancy  unable to obtain MRI due to shrapnel from [place]  2. Bacteremia  gram pos mukesh ?listeria vs. mycobacterium  pending speciation  cont vanco  repeat until clear  3. UTI  MSSA  vanco as above  CT abd pelvis to eval  4. Lung mass  RUL mass with metastasis  suspect underlying lung ca  f/u ct abd pelvis  5. HTN  clonidine 0.1 bid    6. DVT	  lovenox  7. CVA  asa, plavix  lipitor 20  8. DVT ppx  lovenox

## 2018-09-06 NOTE — CONSULT NOTE ADULT - SUBJECTIVE AND OBJECTIVE BOX
TRAV GOVEA  77y, Male  Allergy: penicillin (Unknown)      HPI:  77 M w/ pmh of htn, dld, cva x4 w/ r hemiparesis, poor historian who presents to the ed c/o generalized weakness & loss of appetite. For the past 4 days the pt has not been getting out of bed & has decreased PO intake which per the wife is unusual for him and is not his baseline. Pt denies SOB, chest pain, abd pain, n/v/d, urinary complaints. No fever. (02 Sep 2018 21:31)    FAMILY HISTORY:  No pertinent family history in first degree relatives    PAST MEDICAL & SURGICAL HISTORY:  Hypertension  CVA (cerebral vascular accident)        VITALS:  T(F): 96.7, Max: 98.4 (09-05-18 @ 20:24)  HR: 80  BP: 104/56  RR: 16Vital Signs Last 24 Hrs  T(C): 35.9 (06 Sep 2018 12:20), Max: 36.9 (05 Sep 2018 20:24)  T(F): 96.7 (06 Sep 2018 12:20), Max: 98.4 (05 Sep 2018 20:24)  HR: 80 (06 Sep 2018 12:20) (70 - 90)  BP: 104/56 (06 Sep 2018 12:20) (90/54 - 112/57)  BP(mean): --  RR: 16 (06 Sep 2018 12:20) (16 - 17)  SpO2: --    TESTS & MEASUREMENTS:                        11.9   5.30  )-----------( 175      ( 06 Sep 2018 12:20 )             35.9     09-06    139  |  103  |  9<L>  ----------------------------<  110<H>  4.4   |  23  |  0.8    Ca    8.3<L>      06 Sep 2018 12:20    TPro  5.7<L>  /  Alb  3.3<L>  /  TBili  0.8  /  DBili  x   /  AST  37  /  ALT  11  /  AlkPhos  157<H>  09-06    LIVER FUNCTIONS - ( 06 Sep 2018 12:20 )  Alb: 3.3 g/dL / Pro: 5.7 g/dL / ALK PHOS: 157 U/L / ALT: 11 U/L / AST: 37 U/L / GGT: x             Culture - Blood (collected 09-03-18 @ 05:44)  Source: .Blood None  Gram Stain (09-04-18 @ 21:44):    Growth in anaerobic bottle: Gram Positive Rods  Preliminary Report (09-04-18 @ 21:45):    Growth in anaerobic bottle: Gram Positive Rods    Culture - Blood (collected 09-02-18 @ 16:12)  Source: .Blood Blood  Preliminary Report (09-03-18 @ 23:01):    No growth to date.    Culture - Blood (collected 09-02-18 @ 16:12)  Source: .Blood Blood  Preliminary Report (09-03-18 @ 23:01):    No growth to date.    Culture - Urine (collected 09-02-18 @ 16:12)  Source: .Urine Catheterized  Final Report (09-04-18 @ 19:15):    >100,000 CFU/ml Staphylococcus aureus  Organism: Staphylococcus aureus (09-04-18 @ 19:15)  Organism: Staphylococcus aureus (09-04-18 @ 19:15)      -  Ampicillin/Sulbactam: S <=8/4      -  Cefazolin: S <=4      -  Gentamicin: S 2 Should not be used as monotherapy      -  Oxacillin: S <=0.25      -  RIF- Rifampin: S <=1 Should not be used as monotherapy      -  Tetra/Doxy: S <=1      -  Trimethoprim/Sulfamethoxazole: S <=0.5/9.5      -  Vancomycin: S 1      Method Type: SHAY            RADIOLOGY & ADDITIONAL TESTS:    ANTIBIOTICS:

## 2018-09-07 LAB
ALBUMIN SERPL ELPH-MCNC: 3.1 G/DL — LOW (ref 3.5–5.2)
ALP SERPL-CCNC: 150 U/L — HIGH (ref 30–115)
ALT FLD-CCNC: 10 U/L — SIGNIFICANT CHANGE UP (ref 0–41)
ANION GAP SERPL CALC-SCNC: 11 MMOL/L — SIGNIFICANT CHANGE UP (ref 7–14)
AST SERPL-CCNC: 28 U/L — SIGNIFICANT CHANGE UP (ref 0–41)
BASOPHILS # BLD AUTO: 0.02 K/UL — SIGNIFICANT CHANGE UP (ref 0–0.2)
BASOPHILS NFR BLD AUTO: 0.4 % — SIGNIFICANT CHANGE UP (ref 0–1)
BILIRUB SERPL-MCNC: 0.8 MG/DL — SIGNIFICANT CHANGE UP (ref 0.2–1.2)
BUN SERPL-MCNC: 10 MG/DL — SIGNIFICANT CHANGE UP (ref 10–20)
CALCIUM SERPL-MCNC: 8.2 MG/DL — LOW (ref 8.5–10.1)
CHLORIDE SERPL-SCNC: 103 MMOL/L — SIGNIFICANT CHANGE UP (ref 98–110)
CO2 SERPL-SCNC: 25 MMOL/L — SIGNIFICANT CHANGE UP (ref 17–32)
CREAT SERPL-MCNC: 0.8 MG/DL — SIGNIFICANT CHANGE UP (ref 0.7–1.5)
CULTURE RESULTS: SIGNIFICANT CHANGE UP
CULTURE RESULTS: SIGNIFICANT CHANGE UP
EOSINOPHIL # BLD AUTO: 0.06 K/UL — SIGNIFICANT CHANGE UP (ref 0–0.7)
EOSINOPHIL NFR BLD AUTO: 1.3 % — SIGNIFICANT CHANGE UP (ref 0–8)
GLUCOSE SERPL-MCNC: 134 MG/DL — HIGH (ref 70–99)
HCT VFR BLD CALC: 33.9 % — LOW (ref 42–52)
HGB BLD-MCNC: 11.6 G/DL — LOW (ref 14–18)
IMM GRANULOCYTES NFR BLD AUTO: 0.2 % — SIGNIFICANT CHANGE UP (ref 0.1–0.3)
LYMPHOCYTES # BLD AUTO: 0.62 K/UL — LOW (ref 1.2–3.4)
LYMPHOCYTES # BLD AUTO: 13.7 % — LOW (ref 20.5–51.1)
MAGNESIUM SERPL-MCNC: 1.9 MG/DL — SIGNIFICANT CHANGE UP (ref 1.8–2.4)
MCHC RBC-ENTMCNC: 31.4 PG — HIGH (ref 27–31)
MCHC RBC-ENTMCNC: 34.2 G/DL — SIGNIFICANT CHANGE UP (ref 32–37)
MCV RBC AUTO: 91.9 FL — SIGNIFICANT CHANGE UP (ref 80–94)
MONOCYTES # BLD AUTO: 0.26 K/UL — SIGNIFICANT CHANGE UP (ref 0.1–0.6)
MONOCYTES NFR BLD AUTO: 5.8 % — SIGNIFICANT CHANGE UP (ref 1.7–9.3)
NEUTROPHILS # BLD AUTO: 3.55 K/UL — SIGNIFICANT CHANGE UP (ref 1.4–6.5)
NEUTROPHILS NFR BLD AUTO: 78.6 % — HIGH (ref 42.2–75.2)
NRBC # BLD: 0 /100 WBCS — SIGNIFICANT CHANGE UP (ref 0–0)
PHOSPHATE SERPL-MCNC: 2.8 MG/DL — SIGNIFICANT CHANGE UP (ref 2.1–4.9)
PLATELET # BLD AUTO: 184 K/UL — SIGNIFICANT CHANGE UP (ref 130–400)
POTASSIUM SERPL-MCNC: 4.1 MMOL/L — SIGNIFICANT CHANGE UP (ref 3.5–5)
POTASSIUM SERPL-SCNC: 4.1 MMOL/L — SIGNIFICANT CHANGE UP (ref 3.5–5)
PROT SERPL-MCNC: 5.5 G/DL — LOW (ref 6–8)
RBC # BLD: 3.69 M/UL — LOW (ref 4.7–6.1)
RBC # FLD: 13.3 % — SIGNIFICANT CHANGE UP (ref 11.5–14.5)
SODIUM SERPL-SCNC: 139 MMOL/L — SIGNIFICANT CHANGE UP (ref 135–146)
SPECIMEN SOURCE: SIGNIFICANT CHANGE UP
SPECIMEN SOURCE: SIGNIFICANT CHANGE UP
WBC # BLD: 4.52 K/UL — LOW (ref 4.8–10.8)
WBC # FLD AUTO: 4.52 K/UL — LOW (ref 4.8–10.8)

## 2018-09-07 RX ORDER — PANTOPRAZOLE SODIUM 20 MG/1
40 TABLET, DELAYED RELEASE ORAL
Qty: 0 | Refills: 0 | Status: DISCONTINUED | OUTPATIENT
Start: 2018-09-07 | End: 2018-09-17

## 2018-09-07 RX ADMIN — GABAPENTIN 100 MILLIGRAM(S): 400 CAPSULE ORAL at 17:25

## 2018-09-07 RX ADMIN — ENOXAPARIN SODIUM 65 MILLIGRAM(S): 100 INJECTION SUBCUTANEOUS at 05:11

## 2018-09-07 RX ADMIN — ENOXAPARIN SODIUM 65 MILLIGRAM(S): 100 INJECTION SUBCUTANEOUS at 17:25

## 2018-09-07 RX ADMIN — TAMSULOSIN HYDROCHLORIDE 0.4 MILLIGRAM(S): 0.4 CAPSULE ORAL at 21:40

## 2018-09-07 RX ADMIN — ATORVASTATIN CALCIUM 20 MILLIGRAM(S): 80 TABLET, FILM COATED ORAL at 21:40

## 2018-09-07 RX ADMIN — CLOPIDOGREL BISULFATE 75 MILLIGRAM(S): 75 TABLET, FILM COATED ORAL at 11:49

## 2018-09-07 RX ADMIN — MAGNESIUM OXIDE 400 MG ORAL TABLET 400 MILLIGRAM(S): 241.3 TABLET ORAL at 11:50

## 2018-09-07 RX ADMIN — Medication 0.1 MILLIGRAM(S): at 17:25

## 2018-09-07 RX ADMIN — GABAPENTIN 100 MILLIGRAM(S): 400 CAPSULE ORAL at 05:11

## 2018-09-07 RX ADMIN — Medication 81 MILLIGRAM(S): at 11:49

## 2018-09-07 NOTE — PROGRESS NOTE ADULT - SUBJECTIVE AND OBJECTIVE BOX
TRAV GOVEA 77y Male  MRN#: [id number]   CODE STATUS: FULL    SUBJECTIVE  77 M w/ PMH of htn, dld, cva x4 w/ r hemiparesis, poor historian who presents to the ed c/o generalized weakness & loss of appetite. For the past 4 days the pt has not been getting out of bed & has decreased PO intake which per the wife is unusual for him and is not his baseline. Pt denies SOB, chest pain, abd pain, n/v/d, urinary complaints. No fever.     On admission a CT scan of the had no contrast was done and showed stable moderate chronic microvascular ischemic changes and no evidence of acute intracranial pathology. A chest x ray was done and revealed diffuse bilateral lung masses without significant changes since last chest x ray. UA done on admission was positive and the patient was started on Levaquin.    Blood culture grew gram positive rods and the patient was switched to vancomycin 1 g IV q12h, later discontinued per ID recommendations.. The patient is also undergoing workup for endocarditis and workup for malignancy as the chest x ray showed bilateral lung nodules (known, workup refused by wife in the past, agreeable currently) and CT scan of the thorax showing increase interval in size of the bilateral lung nodules, CT scan abdomen and pelvis negative for intra-abdominal metastases.      Overnight  No major events overnight    Subjective  The patient doesn't have any complaints, He is alert and oriented to location and people.       Present Today:   - Odell X  - Drains X  - Central Line X      OBJECTIVE  PAST MEDICAL & SURGICAL HISTORY  Hypertension  CVA (cerebral vascular accident)    ALLERGIES:  penicillin (Unknown)    MEDICATIONS:  STANDING MEDICATIONS  aspirin  chewable 81 milliGRAM(s) Oral daily  atorvastatin 20 milliGRAM(s) Oral at bedtime  cloNIDine 0.1 milliGRAM(s) Oral two times a day  clopidogrel Tablet 75 milliGRAM(s) Oral daily  enoxaparin Injectable 65 milliGRAM(s) SubCutaneous two times a day  gabapentin 100 milliGRAM(s) Oral two times a day  magnesium oxide 400 milliGRAM(s) Oral daily  tamsulosin 0.4 milliGRAM(s) Oral at bedtime    PRN MEDICATIONS  ALBUTerol/ipratropium for Nebulization 3 milliLiter(s) Nebulizer every 6 hours PRN      VITAL SIGNS: Last 24 Hours  T(C): 35.7 (07 Sep 2018 05:10), Max: 36.5 (06 Sep 2018 21:09)  T(F): 96.2 (07 Sep 2018 05:10), Max: 97.7 (06 Sep 2018 21:09)  HR: 63 (07 Sep 2018 05:10) (60 - 80)  BP: 105/56 (07 Sep 2018 05:10) (104/56 - 111/52)  BP(mean): --  RR: 16 (07 Sep 2018 05:10) (16 - 16)  SpO2: --        LABS:                        11.9   5.30  )-----------( 175      ( 06 Sep 2018 12:20 )             35.9     09-06    139  |  103  |  9<L>  ----------------------------<  110<H>  4.4   |  23  |  0.8    Ca    8.3<L>      06 Sep 2018 12:20    TPro  5.7<L>  /  Alb  3.3<L>  /  TBili  0.8  /  DBili  x   /  AST  37  /  ALT  11  /  AlkPhos  157<H>  09-06      RADIOLOGY:    CT HEAD NO CONTRAST  No CT evidence for acute intracranial pathology.  Chronic infarctions in the left frontal, parietal, and anterior temporal   lobe and chronic lacunar infarct within the posterior limb of the right   basal ganglia.  Stable moderate chronic microvascular ischemic changes    CHEST X RAY  Diffuse bilateral lung masses without significant change    CT CHEST, ABDOMEN AND PELVIS  Since August 7, 2017, increased number and size of a bilateral pulmonary   nodules and masses, largest within the right upper lobe measuring 4.9 x   4.3 cm, consistent with worsening malignant disease.    CT ABDOMEN AND PELVIS WITH ORAL AND IV CONTRAST  No evidence of intra-abdominal metastases. Interval increase in size and   number of pulmonary nodules (please see CT chest report for additional   details of the findings above the diaphragm).  Colonic diverticulosis without evidence of diverticulitis.  5.3 cm infrarenal abdominal aortic aneurysm status post stent graft   repair.    ULTRASOUND DOPPLER OF THE LOWER EXTREMITIES  Mynor appearing DVT noted in the left popliteal vein.  Right thigh mass noted measuring 2 x 2 centimeters second mass is noted   measuring 2 x 3 cm    PHYSICAL EXAM:    GENERAL: No acute distress, afebrile, BP borderline at baseline. Alert. Oriented to people and location.   HEENT:  Sclera mildly injected, mucosa moist  PULMONARY: scattered bilateral rhonchi  CARDIOVASCULAR: Heart sounds regular   GASTROINTESTINAL: Soft, nondistended, bowel sounds audible   MUSCULOSKELETAL:  No lower extremity edema, positive peripheral pulses. Calves not warm / tender to touch   NEUROLOGY: Mild weakness right upper and lower extremities       ASSESSMENT & PLAN    Past medical history and hospital course     77 M w/ PMHx  of htn, dld, cva x4 w/ r hemiparesis, poor historian who presents to the ed c/o generalized weakness, loss of appetite and burning on urination, found to have a positive MARCIE in the urine. Blood culture positive for coryneform Gram (+) rods. Active workup for bilateral lung nodules     # Metabolic encephalopathy, increased confusion : Encephalopathy likely secondary to UTI  - Blood culture on 09/03 positive for coryneform Gram (+) rods. Sensitivities pending   - TTE performed. Results pending  - Vancomycin 1g IV q12h. Discontinued per ID recommendations   - ID consult : Maintain antibiotics off and follow-up Gram (+) organism and sensitivities   - Urine culture : > 100 000 CFU / mL MARCIE   - Abdominal ultrasound showed cholelithiasis with few echogenic foci nonmobile and no sonographic evidence of cholecystitis    # Bilateral lung masses on chest x ray : Known   - CT chest w/IV contrast + CT abdomen and pelvis w/oral and IV contrast performed : The CT scan of the chest showing increase number and size of the pulmonary nodules since   August 7, 2017. No evidence of intra-abdominal metastases on CT scan abdomen and pelvis   MRI : PATIENT HAS DIFFUSE BODY METAL PIECES FROM [place]  - Oncology consult placed  - Pulmonary following for possibility of bronchoscopy     # HO HTN / DLD / CVA  - C/w home meds : Aspirin, Plavix, Atorvastatin, Clonidine     # DVT   - VA duplex of the lower extremities showed Mynor appearing DVT in the left popliteal vein. Right thigh mass measuring 2 x 2 centimeters second mass measuring 2 x 3 cm  - Patient on therapeutic Lovenox : 65 mg sc BID                                                                             ----------------------------------------------------------------------------------------    # DVT : Lovenox 65 mg sc BID     # GI prophylaxis : Protonix 40 mg daily     # Diet : DASH diet     # Activity : Increase as tolerated     # Code status : FULL     # Disposition : home, lives with wife                                                                             ------------------------------------------------------------------------- TRAV GOVEA 77y Male  MRN#: [id number]   CODE STATUS: FULL    SUBJECTIVE  77 M w/ PMH of htn, dld, cva x4 w/ r hemiparesis, poor historian who presents to the ed c/o generalized weakness & loss of appetite. For the past 4 days the pt has not been getting out of bed & has decreased PO intake which per the wife is unusual for him and is not his baseline. Pt denies SOB, chest pain, abd pain, n/v/d, urinary complaints. No fever.     On admission a CT scan of the had no contrast was done and showed stable moderate chronic microvascular ischemic changes and no evidence of acute intracranial pathology. A chest x ray was done and revealed diffuse bilateral lung masses without significant changes since last chest x ray. UA done on admission was positive and the patient was started on Levaquin.    Blood culture grew gram positive rods and the patient was switched to vancomycin 1 g IV q12h, later discontinued per ID recommendations.. The patient is also undergoing workup for endocarditis and workup for malignancy as the chest x ray showed bilateral lung nodules (known, workup refused by wife in the past, agreeable currently) and CT scan of the thorax showing increase interval in size of the bilateral lung nodules, CT scan abdomen and pelvis negative for intra-abdominal metastases.      Overnight  No major events overnight    Subjective  The patient doesn't have any complaints, He is alert and oriented to location and people.   no cp, sob, abd pain, fever.      Present Today:   - Odell X  - Drains X  - Central Line X      OBJECTIVE  PAST MEDICAL & SURGICAL HISTORY  Hypertension  CVA (cerebral vascular accident)    ALLERGIES:  penicillin (Unknown)    MEDICATIONS:  STANDING MEDICATIONS  aspirin  chewable 81 milliGRAM(s) Oral daily  atorvastatin 20 milliGRAM(s) Oral at bedtime  cloNIDine 0.1 milliGRAM(s) Oral two times a day  clopidogrel Tablet 75 milliGRAM(s) Oral daily  enoxaparin Injectable 65 milliGRAM(s) SubCutaneous two times a day  gabapentin 100 milliGRAM(s) Oral two times a day  magnesium oxide 400 milliGRAM(s) Oral daily  tamsulosin 0.4 milliGRAM(s) Oral at bedtime    PRN MEDICATIONS  ALBUTerol/ipratropium for Nebulization 3 milliLiter(s) Nebulizer every 6 hours PRN      VITAL SIGNS: Last 24 Hours  T(C): 35.7 (07 Sep 2018 05:10), Max: 36.5 (06 Sep 2018 21:09)  T(F): 96.2 (07 Sep 2018 05:10), Max: 97.7 (06 Sep 2018 21:09)  HR: 63 (07 Sep 2018 05:10) (60 - 80)  BP: 105/56 (07 Sep 2018 05:10) (104/56 - 111/52)  BP(mean): --  RR: 16 (07 Sep 2018 05:10) (16 - 16)  SpO2: --        LABS:                        11.9   5.30  )-----------( 175      ( 06 Sep 2018 12:20 )             35.9     09-06    139  |  103  |  9<L>  ----------------------------<  110<H>  4.4   |  23  |  0.8    Ca    8.3<L>      06 Sep 2018 12:20    TPro  5.7<L>  /  Alb  3.3<L>  /  TBili  0.8  /  DBili  x   /  AST  37  /  ALT  11  /  AlkPhos  157<H>  09-06      RADIOLOGY:    CT HEAD NO CONTRAST  No CT evidence for acute intracranial pathology.  Chronic infarctions in the left frontal, parietal, and anterior temporal   lobe and chronic lacunar infarct within the posterior limb of the right   basal ganglia.  Stable moderate chronic microvascular ischemic changes    CHEST X RAY  Diffuse bilateral lung masses without significant change    CT CHEST, ABDOMEN AND PELVIS  Since August 7, 2017, increased number and size of a bilateral pulmonary   nodules and masses, largest within the right upper lobe measuring 4.9 x   4.3 cm, consistent with worsening malignant disease.    CT ABDOMEN AND PELVIS WITH ORAL AND IV CONTRAST  No evidence of intra-abdominal metastases. Interval increase in size and   number of pulmonary nodules (please see CT chest report for additional   details of the findings above the diaphragm).  Colonic diverticulosis without evidence of diverticulitis.  5.3 cm infrarenal abdominal aortic aneurysm status post stent graft   repair.    ULTRASOUND DOPPLER OF THE LOWER EXTREMITIES  Mynor appearing DVT noted in the left popliteal vein.  Right thigh mass noted measuring 2 x 2 centimeters second mass is noted   measuring 2 x 3 cm    PHYSICAL EXAM:    GENERAL: No acute distress, afebrile, BP borderline at baseline. Alert. Oriented to people and location.   HEENT:  Sclera mildly injected, mucosa moist  PULMONARY: scattered bilateral rhonchi  CARDIOVASCULAR: Heart sounds regular   GASTROINTESTINAL: Soft, nondistended, bowel sounds audible   MUSCULOSKELETAL:  No lower extremity edema, positive peripheral pulses. Calves not warm / tender to touch   NEUROLOGY: Mild weakness right upper and lower extremities       ASSESSMENT & PLAN    Past medical history and hospital course     77 M w/ PMHx  of htn, dld, cva x4 w/ r hemiparesis, poor historian who presents to the ed c/o generalized weakness, loss of appetite and burning on urination, found to have a positive MARCIE in the urine. Blood culture positive for coryneform Gram (+) rods. Active workup for bilateral lung nodules     # Metabolic encephalopathy, increased confusion : Encephalopathy likely secondary to UTI  - Blood culture on 09/03 positive for coryneform Gram (+) rods. Sensitivities pending   - TTE performed. Results pending  - Vancomycin 1g IV q12h. Discontinued per ID recommendations   - ID consult : Maintain antibiotics off and follow-up Gram (+) organism and sensitivities   - Urine culture : > 100 000 CFU / mL MARCIE   - Abdominal ultrasound showed cholelithiasis with few echogenic foci nonmobile and no sonographic evidence of cholecystitis    # Bilateral lung masses on chest x ray : Known   - CT chest w/IV contrast + CT abdomen and pelvis w/oral and IV contrast performed : The CT scan of the chest showing increase number and size of the pulmonary nodules since   August 7, 2017. No evidence of intra-abdominal metastases on CT scan abdomen and pelvis   MRI : PATIENT HAS DIFFUSE BODY METAL PIECES FROM [place]  - Oncology consult placed  - Pulmonary following for possibility of bronchoscopy     # HO HTN / DLD / CVA  - C/w home meds : Aspirin, Plavix, Atorvastatin, Clonidine     # DVT   - VA duplex of the lower extremities showed Mynor appearing DVT in the left popliteal vein. Right thigh mass measuring 2 x 2 centimeters second mass measuring 2 x 3 cm  - Patient on therapeutic Lovenox : 65 mg sc BID                                                                             ----------------------------------------------------------------------------------------    # DVT : Lovenox 65 mg sc BID     # GI prophylaxis : Protonix 40 mg daily     # Diet : DASH diet     # Activity : Increase as tolerated     # Code status : FULL     # Disposition : home, lives with wife                                                                             -------------------------------------------------------------------------

## 2018-09-07 NOTE — CONSULT NOTE ADULT - SUBJECTIVE AND OBJECTIVE BOX
Patient is a 77y old  Male who presents with a chief complaint of Encephalopathy, UTI (07 Sep 2018 09:47)      HPI:  77 M w/ pmh of htn, dld, cva x4 w/ r hemiparesis, poor historian who presents to the ed c/o generalized weakness & loss of appetite. For the past 4 days the pt has not been getting out of bed & has decreased PO intake which per the wife is unusual for him and is not his baseline. Pt denies SOB, chest pain, abd pain, n/v/d, urinary complaints. No fever. (02 Sep 2018 21:31)    On this admission pt found to have MARCIE UTI, DVT L Popliteal vein, and worsening pulmonary nodules.      PAST MEDICAL & SURGICAL HISTORY:  Hypertension  CVA (cerebral vascular accident)  Hx of R leg Sarcoma      SOCIAL HX:   Smoking : Active, more than 30 year smoking hx      FAMILY HISTORY:  No pertinent family history in first degree relatives  .  No cardiovascular or pulmonary family history     Review of System:  See HPI    Allergies    penicillin (Unknown)    Intolerances          PHYSICAL EXAM  Vital Signs Last 24 Hrs  T(C): 35.7 (07 Sep 2018 05:10), Max: 36.5 (06 Sep 2018 21:09)  T(F): 96.2 (07 Sep 2018 05:10), Max: 97.7 (06 Sep 2018 21:09)  HR: 63 (07 Sep 2018 05:10) (60 - 75)  BP: 105/56 (07 Sep 2018 05:10) (105/56 - 111/52)  BP(mean): --  RR: 16 (07 Sep 2018 05:10) (16 - 16)  SpO2: --    General: In NAD  HEENT: RACHEL             Lymphatic system: No cervical LN     Lungs: Obed BS  Cardiovascular: Regular  Gastrointestinal: Soft.  + BS   Musculoskeletal: No Clubbing.  Full range of motion.. Moves all extremities  Skin: Warm.  Intact  Neurological: No motor or sensory deficit       LABS:                          11.6   4.52  )-----------( 184      ( 07 Sep 2018 08:50 )             33.9                                               09-07    139  |  103  |  10  ----------------------------<  134<H>  4.1   |  25  |  0.8    Ca    8.2<L>      07 Sep 2018 08:50  Phos  2.8     09-07  Mg     1.9     09-07    TPro  5.5<L>  /  Alb  3.1<L>  /  TBili  0.8  /  DBili  x   /  AST  28  /  ALT  10  /  AlkPhos  150<H>  09-07                                                                                           LIVER FUNCTIONS - ( 07 Sep 2018 08:50 )  Alb: 3.1 g/dL / Pro: 5.5 g/dL / ALK PHOS: 150 U/L / ALT: 10 U/L / AST: 28 U/L / GGT: x                                                                                                MEDICATIONS  (STANDING):  aspirin  chewable 81 milliGRAM(s) Oral daily  atorvastatin 20 milliGRAM(s) Oral at bedtime  cloNIDine 0.1 milliGRAM(s) Oral two times a day  clopidogrel Tablet 75 milliGRAM(s) Oral daily  enoxaparin Injectable 65 milliGRAM(s) SubCutaneous two times a day  gabapentin 100 milliGRAM(s) Oral two times a day  magnesium oxide 400 milliGRAM(s) Oral daily  pantoprazole    Tablet 40 milliGRAM(s) Oral before breakfast  tamsulosin 0.4 milliGRAM(s) Oral at bedtime    MEDICATIONS  (PRN):  ALBUTerol/ipratropium for Nebulization 3 milliLiter(s) Nebulizer every 6 hours PRN Shortness of Breath and/or Wheezing      < from: CT Chest w/ IV Cont (09.06.18 @ 10:44) >  Since August 7, 2017, increased number and size of a bilateral pulmonary   nodules and masses, largest within the right upper lobe measuring 4.9 x   4.3 cm, consistent with worsening malignant disease.    < end of copied text >  < from: CT Head No Cont (09.02.18 @ 17:47) >  No CT evidence for acute intracranial pathology.    Chronic infarctions in the left frontal, parietal, and anterior temporal   lobe and chronic lacunar infarct within the posterior limb of the right   basal ganglia.    Stable moderate chronic microvascular ischemic changes    < end of copied text >  < from: CT Abdomen and Pelvis w/ Oral Cont and w/ IV Cont (09.06.18 @ 10:44) >  No evidence of intra-abdominal metastases. Interval increase in size and   number of pulmonary nodules (please see CT chest report for additional   details of the findings above the diaphragm).    Colonic diverticulosis without evidence of diverticulitis.    5.3 cm infrarenal abdominal aortic aneurysm status post stent graft   repair    < end of copied text >  < from: VA Duplex Lower Ext Vein Scan, Bilat (09.04.18 @ 09:28) >  Mynor appearing DVT noted in the left popliteal vein.  Right thigh mass noted measuring 2 x 2 centimeters second mass is noted   measuring 2 x 3 cm    < end of copied text > Patient is a 77y old  Male who presents with a chief complaint of Encephalopathy, UTI (07 Sep 2018 09:47)      HPI:  77 M w/ pmh of htn, dld, cva x4 w/ r hemiparesis, poor historian who presents to the ed c/o generalized weakness & loss of appetite. For the past 4 days the pt has not been getting out of bed & has decreased PO intake which per the wife is unusual for him and is not his baseline. Pt denies SOB, chest pain, abd pain, n/v/d, urinary complaints. No fever. (02 Sep 2018 21:31)    On this admission pt found to have MARCIE UTI, DVT L Popliteal vein, and worsening pulmonary nodules.      PAST MEDICAL & SURGICAL HISTORY:  Hypertension  CVA (cerebral vascular accident)  Hx of R leg Sarcoma      SOCIAL HX:   Smoking : Active, more than 30 year smoking hx      FAMILY HISTORY:  No pertinent family history in first degree relatives  .  No cardiovascular or pulmonary family history     Review of System:  See HPI    Allergies    penicillin (Unknown)    Intolerances          PHYSICAL EXAM  Vital Signs Last 24 Hrs  T(C): 35.7 (07 Sep 2018 05:10), Max: 36.5 (06 Sep 2018 21:09)  T(F): 96.2 (07 Sep 2018 05:10), Max: 97.7 (06 Sep 2018 21:09)  HR: 63 (07 Sep 2018 05:10) (60 - 75)  BP: 105/56 (07 Sep 2018 05:10) (105/56 - 111/52)  RR: 16 (07 Sep 2018 05:10) (16 - 16)      General: In NAD  HEENT: RACHEL             Lymphatic system: No cervical LN     Lungs: Obed BS  Cardiovascular: Regular  Gastrointestinal: Soft.  + BS   Musculoskeletal: No Clubbing.  Full range of motion.. Moves all extremities  Skin: Warm.  Intact  Neurological: Right hemiparesis       LABS:                          11.6   4.52  )-----------( 184      ( 07 Sep 2018 08:50 )             33.9                                               09-07    139  |  103  |  10  ----------------------------<  134<H>  4.1   |  25  |  0.8    Ca    8.2<L>      07 Sep 2018 08:50  Phos  2.8     09-07  Mg     1.9     09-07    TPro  5.5<L>  /  Alb  3.1<L>  /  TBili  0.8  /  DBili  x   /  AST  28  /  ALT  10  /  AlkPhos  150<H>  09-07                                                                                           LIVER FUNCTIONS - ( 07 Sep 2018 08:50 )  Alb: 3.1 g/dL / Pro: 5.5 g/dL / ALK PHOS: 150 U/L / ALT: 10 U/L / AST: 28 U/L / GGT: x                                                                                                MEDICATIONS  (STANDING):  aspirin  chewable 81 milliGRAM(s) Oral daily  atorvastatin 20 milliGRAM(s) Oral at bedtime  cloNIDine 0.1 milliGRAM(s) Oral two times a day  clopidogrel Tablet 75 milliGRAM(s) Oral daily  enoxaparin Injectable 65 milliGRAM(s) SubCutaneous two times a day  gabapentin 100 milliGRAM(s) Oral two times a day  magnesium oxide 400 milliGRAM(s) Oral daily  pantoprazole    Tablet 40 milliGRAM(s) Oral before breakfast  tamsulosin 0.4 milliGRAM(s) Oral at bedtime    MEDICATIONS  (PRN):  ALBUTerol/ipratropium for Nebulization 3 milliLiter(s) Nebulizer every 6 hours PRN Shortness of Breath and/or Wheezing      < from: CT Chest w/ IV Cont (09.06.18 @ 10:44) >  Since August 7, 2017, increased number and size of a bilateral pulmonary   nodules and masses, largest within the right upper lobe measuring 4.9 x   4.3 cm, consistent with worsening malignant disease.    < end of copied text >  < from: CT Head No Cont (09.02.18 @ 17:47) >  No CT evidence for acute intracranial pathology.    Chronic infarctions in the left frontal, parietal, and anterior temporal   lobe and chronic lacunar infarct within the posterior limb of the right   basal ganglia.    Stable moderate chronic microvascular ischemic changes    < end of copied text >  < from: CT Abdomen and Pelvis w/ Oral Cont and w/ IV Cont (09.06.18 @ 10:44) >  No evidence of intra-abdominal metastases. Interval increase in size and   number of pulmonary nodules (please see CT chest report for additional   details of the findings above the diaphragm).    Colonic diverticulosis without evidence of diverticulitis.    5.3 cm infrarenal abdominal aortic aneurysm status post stent graft   repair    < end of copied text >  < from: VA Duplex Lower Ext Vein Scan, Bilat (09.04.18 @ 09:28) >  Mynor appearing DVT noted in the left popliteal vein.  Right thigh mass noted measuring 2 x 2 centimeters second mass is noted   measuring 2 x 3 cm    < end of copied text >

## 2018-09-07 NOTE — CONSULT NOTE ADULT - ASSESSMENT
Impression:  Worsening Pulmonary nodules secondary to primary lung cancer vs Metastatic disease  Acute DVT on Therapeutic Lovenox    Plan:  - Pt will need lung biopsy Bronchoscopy vs IR guided  - Oncology consult  - Can be transitioned to Eliquis for DVT treatment  - Will discuss with Pulmonary attending Impression:  Worsening Pulmonary nodules secondary to primary lung cancer vs Metastatic disease  Acute DVT on Therapeutic Lovenox    Plan:  - Oncology consult  - DVT treatment with Lovenox for now  - Consider Palliative care evaluation  - May need IR guided biopsy depending on oncology recommendations  - Prognosis guarded

## 2018-09-07 NOTE — PROGRESS NOTE ADULT - SUBJECTIVE AND OBJECTIVE BOX
TRAV GOVEA  77y, Male      OVERNIGHT EVENTS:    none    VITALS:  T(F): 96.2, Max: 97.7 (09-06-18 @ 21:09)  HR: 63  BP: 105/56  RR: 16Vital Signs Last 24 Hrs  T(C): 35.7 (07 Sep 2018 05:10), Max: 36.5 (06 Sep 2018 21:09)  T(F): 96.2 (07 Sep 2018 05:10), Max: 97.7 (06 Sep 2018 21:09)  HR: 63 (07 Sep 2018 05:10) (60 - 80)  BP: 105/56 (07 Sep 2018 05:10) (104/56 - 111/52)  BP(mean): --  RR: 16 (07 Sep 2018 05:10) (16 - 16)  SpO2: --    TESTS & MEASUREMENTS:                        11.9   5.30  )-----------( 175      ( 06 Sep 2018 12:20 )             35.9     09-06    139  |  103  |  9<L>  ----------------------------<  110<H>  4.4   |  23  |  0.8    Ca    8.3<L>      06 Sep 2018 12:20    TPro  5.7<L>  /  Alb  3.3<L>  /  TBili  0.8  /  DBili  x   /  AST  37  /  ALT  11  /  AlkPhos  157<H>  09-06    LIVER FUNCTIONS - ( 06 Sep 2018 12:20 )  Alb: 3.3 g/dL / Pro: 5.7 g/dL / ALK PHOS: 157 U/L / ALT: 11 U/L / AST: 37 U/L / GGT: x             Culture - Blood (collected 09-03-18 @ 05:44)  Source: .Blood None  Gram Stain (09-04-18 @ 21:44):    Growth in anaerobic bottle: Gram Positive Rods  Final Report (09-06-18 @ 23:46):    Growth in anaerobic bottle: Coryneform Gram Positive Rods    Culture - Blood (collected 09-02-18 @ 16:12)  Source: .Blood Blood  Preliminary Report (09-03-18 @ 23:01):    No growth to date.    Culture - Blood (collected 09-02-18 @ 16:12)  Source: .Blood Blood  Preliminary Report (09-03-18 @ 23:01):    No growth to date.    Culture - Urine (collected 09-02-18 @ 16:12)  Source: .Urine Catheterized  Final Report (09-04-18 @ 19:15):    >100,000 CFU/ml Staphylococcus aureus  Organism: Staphylococcus aureus (09-04-18 @ 19:15)  Organism: Staphylococcus aureus (09-04-18 @ 19:15)      -  Ampicillin/Sulbactam: S <=8/4      -  Cefazolin: S <=4      -  Gentamicin: S 2 Should not be used as monotherapy      -  Oxacillin: S <=0.25      -  RIF- Rifampin: S <=1 Should not be used as monotherapy      -  Tetra/Doxy: S <=1      -  Trimethoprim/Sulfamethoxazole: S <=0.5/9.5      -  Vancomycin: S 1      Method Type: SHAY            RADIOLOGY & ADDITIONAL TESTS:    ANTIBIOTICS:

## 2018-09-07 NOTE — PROGRESS NOTE ADULT - ASSESSMENT
IMPRESSION:  Primary problem seems to be a progressive neoplastic process with CXR form 2014 being abnormal and scince then has progressed with multipe large diffuse nodular masses  in the lungs bilaterally.  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  Asumptomatic bacturia with MARCIE with no evidence of endocarditis.  No pyelonephritis  Blood cultures contaminated with gram positive rods.  No evidence of sepsis with encephalopathy  CT head with no mets    RECOMMENDATIONS:  F/U identification of the gram positive rods.  Neoplastic work up.  Maintain off antibiotics.

## 2018-09-08 LAB
ALBUMIN SERPL ELPH-MCNC: 2.9 G/DL — LOW (ref 3.5–5.2)
ALP SERPL-CCNC: 177 U/L — HIGH (ref 30–115)
ALT FLD-CCNC: 15 U/L — SIGNIFICANT CHANGE UP (ref 0–41)
ANION GAP SERPL CALC-SCNC: 12 MMOL/L — SIGNIFICANT CHANGE UP (ref 7–14)
AST SERPL-CCNC: 38 U/L — SIGNIFICANT CHANGE UP (ref 0–41)
BILIRUB SERPL-MCNC: 0.6 MG/DL — SIGNIFICANT CHANGE UP (ref 0.2–1.2)
BUN SERPL-MCNC: 10 MG/DL — SIGNIFICANT CHANGE UP (ref 10–20)
CALCIUM SERPL-MCNC: 7.7 MG/DL — LOW (ref 8.5–10.1)
CHLORIDE SERPL-SCNC: 102 MMOL/L — SIGNIFICANT CHANGE UP (ref 98–110)
CO2 SERPL-SCNC: 23 MMOL/L — SIGNIFICANT CHANGE UP (ref 17–32)
CREAT SERPL-MCNC: 0.8 MG/DL — SIGNIFICANT CHANGE UP (ref 0.7–1.5)
GLUCOSE SERPL-MCNC: 159 MG/DL — HIGH (ref 70–99)
HCT VFR BLD CALC: 31 % — LOW (ref 42–52)
HGB BLD-MCNC: 10.5 G/DL — LOW (ref 14–18)
MAGNESIUM SERPL-MCNC: 2 MG/DL — SIGNIFICANT CHANGE UP (ref 1.8–2.4)
MCHC RBC-ENTMCNC: 31.3 PG — HIGH (ref 27–31)
MCHC RBC-ENTMCNC: 33.9 G/DL — SIGNIFICANT CHANGE UP (ref 32–37)
MCV RBC AUTO: 92.5 FL — SIGNIFICANT CHANGE UP (ref 80–94)
NRBC # BLD: 0 /100 WBCS — SIGNIFICANT CHANGE UP (ref 0–0)
PHOSPHATE SERPL-MCNC: 2.7 MG/DL — SIGNIFICANT CHANGE UP (ref 2.1–4.9)
PLATELET # BLD AUTO: 166 K/UL — SIGNIFICANT CHANGE UP (ref 130–400)
POTASSIUM SERPL-MCNC: 4.3 MMOL/L — SIGNIFICANT CHANGE UP (ref 3.5–5)
POTASSIUM SERPL-SCNC: 4.3 MMOL/L — SIGNIFICANT CHANGE UP (ref 3.5–5)
PROT SERPL-MCNC: 5.3 G/DL — LOW (ref 6–8)
RBC # BLD: 3.35 M/UL — LOW (ref 4.7–6.1)
RBC # FLD: 13.2 % — SIGNIFICANT CHANGE UP (ref 11.5–14.5)
SODIUM SERPL-SCNC: 137 MMOL/L — SIGNIFICANT CHANGE UP (ref 135–146)
WBC # BLD: 4.54 K/UL — LOW (ref 4.8–10.8)
WBC # FLD AUTO: 4.54 K/UL — LOW (ref 4.8–10.8)

## 2018-09-08 RX ADMIN — GABAPENTIN 100 MILLIGRAM(S): 400 CAPSULE ORAL at 06:19

## 2018-09-08 RX ADMIN — Medication 81 MILLIGRAM(S): at 11:21

## 2018-09-08 RX ADMIN — Medication 0.1 MILLIGRAM(S): at 06:19

## 2018-09-08 RX ADMIN — GABAPENTIN 100 MILLIGRAM(S): 400 CAPSULE ORAL at 17:02

## 2018-09-08 RX ADMIN — MAGNESIUM OXIDE 400 MG ORAL TABLET 400 MILLIGRAM(S): 241.3 TABLET ORAL at 11:21

## 2018-09-08 RX ADMIN — ENOXAPARIN SODIUM 65 MILLIGRAM(S): 100 INJECTION SUBCUTANEOUS at 17:02

## 2018-09-08 RX ADMIN — ATORVASTATIN CALCIUM 20 MILLIGRAM(S): 80 TABLET, FILM COATED ORAL at 21:21

## 2018-09-08 RX ADMIN — TAMSULOSIN HYDROCHLORIDE 0.4 MILLIGRAM(S): 0.4 CAPSULE ORAL at 21:21

## 2018-09-08 RX ADMIN — PANTOPRAZOLE SODIUM 40 MILLIGRAM(S): 20 TABLET, DELAYED RELEASE ORAL at 06:19

## 2018-09-08 RX ADMIN — Medication 0.1 MILLIGRAM(S): at 17:02

## 2018-09-08 RX ADMIN — CLOPIDOGREL BISULFATE 75 MILLIGRAM(S): 75 TABLET, FILM COATED ORAL at 11:21

## 2018-09-08 RX ADMIN — ENOXAPARIN SODIUM 65 MILLIGRAM(S): 100 INJECTION SUBCUTANEOUS at 06:18

## 2018-09-08 NOTE — PROGRESS NOTE ADULT - ASSESSMENT
TRAV GOVEA 77y Male  MRN#: [id number]   CODE STATUS:________      SUBJECTIVE  77 M w/ PMH of htn, dld, cva x4 w/ r hemiparesis, poor historian who presents to the ed c/o generalized weakness & loss of appetite. For the past 4 days the pt has not been getting out of bed & has decreased PO intake which per the wife is unusual for him and is not his baseline. Pt denies SOB, chest pain, abd pain, n/v/d, urinary complaints. No fever.     On admission a CT scan of the had no contrast was done and showed stable moderate chronic microvascular ischemic changes and no evidence of acute intracranial pathology. A chest x ray was done and revealed diffuse bilateral lung masses without significant changes since last chest x ray. UA done on admission was positive and the patient was started on Levaquin.    Blood culture grew gram positive rods and the patient was switched to vancomycin 1 g IV q12h, later discontinued per ID recommendations.. The patient is also undergoing workup for endocarditis and workup for malignancy as the chest x ray showed bilateral lung nodules (known, workup refused by wife in the past, agreeable currently) and CT scan of the thorax showing increase interval in size of the bilateral lung nodules, CT scan abdomen and pelvis negative for intra-abdominal metastases.      Overnight  No major events overnight    Subjective      Present Today:   - Odell X  - Drains X  - Central Line X                                            ----------------------------------------------------------  OBJECTIVE  PAST MEDICAL & SURGICAL HISTORY  Hypertension  CVA (cerebral vascular accident)                                            -----------------------------------------------------------  ALLERGIES:  penicillin (Unknown)                                            ------------------------------------------------------------  MEDICATIONS:  STANDING MEDICATIONS  aspirin  chewable 81 milliGRAM(s) Oral daily  atorvastatin 20 milliGRAM(s) Oral at bedtime  cloNIDine 0.1 milliGRAM(s) Oral two times a day  clopidogrel Tablet 75 milliGRAM(s) Oral daily  enoxaparin Injectable 65 milliGRAM(s) SubCutaneous two times a day  gabapentin 100 milliGRAM(s) Oral two times a day  magnesium oxide 400 milliGRAM(s) Oral daily  pantoprazole    Tablet 40 milliGRAM(s) Oral before breakfast  tamsulosin 0.4 milliGRAM(s) Oral at bedtime    PRN MEDICATIONS  ALBUTerol/ipratropium for Nebulization 3 milliLiter(s) Nebulizer every 6 hours PRN                                            ------------------------------------------------------------  VITAL SIGNS: Last 24 Hours  T(C): 36.2 (08 Sep 2018 04:50), Max: 37.5 (07 Sep 2018 13:00)  T(F): 97.1 (08 Sep 2018 04:50), Max: 99.5 (07 Sep 2018 13:00)  HR: 59 (08 Sep 2018 06:17) (59 - 81)  BP: 117/58 (08 Sep 2018 06:17) (94/60 - 122/72)  BP(mean): --  RR: 16 (08 Sep 2018 04:50) (16 - 16)  SpO2: 99% (07 Sep 2018 13:00) (99% - 99%)                                             --------------------------------------------------------------  LABS:                        11.6   4.52  )-----------( 184      ( 07 Sep 2018 08:50 )             33.9     09-07    139  |  103  |  10  ----------------------------<  134<H>  4.1   |  25  |  0.8    Ca    8.2<L>      07 Sep 2018 08:50  Phos  2.8     09-07  Mg     1.9     09-07    TPro  5.5<L>  /  Alb  3.1<L>  /  TBili  0.8  /  DBili  x   /  AST  28  /  ALT  10  /  AlkPhos  150<H>  09-07                Culture - Blood (collected 06 Sep 2018 12:20)  Source: .Blood None  Preliminary Report (08 Sep 2018 01:01):    No growth to date.                                                    -------------------------------------------------------------  RADIOLOGY:    CT HEAD NO CONTRAST  No CT evidence for acute intracranial pathology.  Chronic infarctions in the left frontal, parietal, and anterior temporal   lobe and chronic lacunar infarct within the posterior limb of the right   basal ganglia.  Stable moderate chronic microvascular ischemic changes    CHEST X RAY  Diffuse bilateral lung masses without significant change    CT CHEST, ABDOMEN AND PELVIS  Since August 7, 2017, increased number and size of a bilateral pulmonary   nodules and masses, largest within the right upper lobe measuring 4.9 x   4.3 cm, consistent with worsening malignant disease.    CT ABDOMEN AND PELVIS WITH ORAL AND IV CONTRAST  No evidence of intra-abdominal metastases. Interval increase in size and   number of pulmonary nodules (please see CT chest report for additional   details of the findings above the diaphragm).  Colonic diverticulosis without evidence of diverticulitis.  5.3 cm infrarenal abdominal aortic aneurysm status post stent graft   repair.    ULTRASOUND DOPPLER OF THE LOWER EXTREMITIES  Mynor appearing DVT noted in the left popliteal vein.  Right thigh mass noted measuring 2 x 2 centimeters second mass is noted   measuring 2 x 3 cm                                            --------------------------------------------------------------    PHYSICAL EXAM:    GENERAL: No acute distress, afebrile, BP borderline at baseline. Alert. Oriented to people and location.   HEENT:  Sclera mildly injected, mucosa moist  PULMONARY: scattered bilateral rhonchi  CARDIOVASCULAR: Heart sounds regular   GASTROINTESTINAL: Soft, nondistended, bowel sounds audible   MUSCULOSKELETAL:  No lower extremity edema, positive peripheral pulses. Calves not warm / tender to touch   NEUROLOGY: Mild weakness right upper and lower extremities                                              --------------------------------------------------------------    ASSESSMENT & PLAN    77 M w/ PMHx  of htn, dld, cva x4 w/ r hemiparesis, poor historian who presents to the ed c/o generalized weakness, loss of appetite and burning on urination, found to have a positive MARCIE in the urine. Blood culture positive for coryneform Gram (+) rods. Active workup for bilateral lung nodules     # Metabolic encephalopathy, increased confusion : Encephalopathy likely secondary to UTI  - Blood culture on 09/03 positive for coryneform Gram (+) rods. Repeat culture was negative   - TTE performed : could not be read as patient was reflecting all the ultrasounds per echo tech   - ID consult : Maintain antibiotics off and follow-up Gram (+) organism and sensitivities   - Urine culture : > 100 000 CFU / mL MARCIE   - Abdominal ultrasound showed cholelithiasis with few echogenic foci nonmobile and no sonographic evidence of cholecystitis    # Bilateral lung masses on chest x ray : Known   - CT chest w/IV contrast + CT abdomen and pelvis w/oral and IV contrast performed : The CT scan of the chest showing increase number and size of the pulmonary nodules since   August 7, 2017. No evidence of intra-abdominal metastases on CT scan abdomen and pelvis   - Patient has a history of right lower extremity sarcoma   MRI : PATIENT HAS DIFFUSE BODY METAL PIECES FROM [place]  - Oncology consult placed  - Pulmonary consult : consider palliative care, may need IR guided biopsy depending on oncology recommendations     # HO HTN / DLD / CVA  - C/w home meds : Aspirin, Plavix, Atorvastatin, Clonidine     # DVT   - VA duplex of the lower extremities showed Mynor appearing DVT in the left popliteal vein. Right thigh mass measuring 2 x 2 centimeters second mass measuring 2 x 3 cm  - Patient on therapeutic Lovenox : 65 mg sc BID                                                                             ----------------------------------------------------------------------------------------    # DVT : Lovenox 65 mg sc BID     # GI prophylaxis : Protonix 40 mg daily     # Diet : DASH diet     # Activity : Increase as tolerated     # Code status : FULL     # Disposition : home, lives with wife                                                                             --------------------------------------------------------- TRAV GOVEA 77y Male  MRN#: [id number]   CODE STATUS:________      SUBJECTIVE  77 M w/ PMH of htn, dld, cva x4 w/ r hemiparesis, poor historian who presents to the ed c/o generalized weakness & loss of appetite. For the past 4 days the pt has not been getting out of bed & has decreased PO intake which per the wife is unusual for him and is not his baseline. Pt denies SOB, chest pain, abd pain, n/v/d, urinary complaints. No fever.     On admission a CT scan of the had no contrast was done and showed stable moderate chronic microvascular ischemic changes and no evidence of acute intracranial pathology. A chest x ray was done and revealed diffuse bilateral lung masses without significant changes since last chest x ray. UA done on admission was positive and the patient was started on Levaquin.    Blood culture grew gram positive rods and the patient was switched to vancomycin 1 g IV q12h, later discontinued per ID recommendations.. The patient is also undergoing workup for endocarditis and workup for malignancy as the chest x ray showed bilateral lung nodules (known, workup refused by wife in the past, agreeable currently) and CT scan of the thorax showing increase interval in size of the bilateral lung nodules, CT scan abdomen and pelvis negative for intra-abdominal metastases.      Overnight  No major events overnight  c/o diffuse pain, nonradiating, sharp, not worsened with movement.  no sob, fever, diarrhea.    Subjective      Present Today:   - Odell X  - Drains X  - Central Line X                                            ----------------------------------------------------------  OBJECTIVE  PAST MEDICAL & SURGICAL HISTORY  Hypertension  CVA (cerebral vascular accident)                                            -----------------------------------------------------------  ALLERGIES:  penicillin (Unknown)                                            ------------------------------------------------------------  MEDICATIONS:  STANDING MEDICATIONS  aspirin  chewable 81 milliGRAM(s) Oral daily  atorvastatin 20 milliGRAM(s) Oral at bedtime  cloNIDine 0.1 milliGRAM(s) Oral two times a day  clopidogrel Tablet 75 milliGRAM(s) Oral daily  enoxaparin Injectable 65 milliGRAM(s) SubCutaneous two times a day  gabapentin 100 milliGRAM(s) Oral two times a day  magnesium oxide 400 milliGRAM(s) Oral daily  pantoprazole    Tablet 40 milliGRAM(s) Oral before breakfast  tamsulosin 0.4 milliGRAM(s) Oral at bedtime    PRN MEDICATIONS  ALBUTerol/ipratropium for Nebulization 3 milliLiter(s) Nebulizer every 6 hours PRN                                            ------------------------------------------------------------  VITAL SIGNS: Last 24 Hours  T(C): 36.2 (08 Sep 2018 04:50), Max: 37.5 (07 Sep 2018 13:00)  T(F): 97.1 (08 Sep 2018 04:50), Max: 99.5 (07 Sep 2018 13:00)  HR: 59 (08 Sep 2018 06:17) (59 - 81)  BP: 117/58 (08 Sep 2018 06:17) (94/60 - 122/72)  BP(mean): --  RR: 16 (08 Sep 2018 04:50) (16 - 16)  SpO2: 99% (07 Sep 2018 13:00) (99% - 99%)                                             --------------------------------------------------------------  LABS:                        11.6   4.52  )-----------( 184      ( 07 Sep 2018 08:50 )             33.9     09-07    139  |  103  |  10  ----------------------------<  134<H>  4.1   |  25  |  0.8    Ca    8.2<L>      07 Sep 2018 08:50  Phos  2.8     09-07  Mg     1.9     09-07    TPro  5.5<L>  /  Alb  3.1<L>  /  TBili  0.8  /  DBili  x   /  AST  28  /  ALT  10  /  AlkPhos  150<H>  09-07                Culture - Blood (collected 06 Sep 2018 12:20)  Source: .Blood None  Preliminary Report (08 Sep 2018 01:01):    No growth to date.                                                    -------------------------------------------------------------  RADIOLOGY:    CT HEAD NO CONTRAST  No CT evidence for acute intracranial pathology.  Chronic infarctions in the left frontal, parietal, and anterior temporal   lobe and chronic lacunar infarct within the posterior limb of the right   basal ganglia.  Stable moderate chronic microvascular ischemic changes    CHEST X RAY  Diffuse bilateral lung masses without significant change    CT CHEST, ABDOMEN AND PELVIS  Since August 7, 2017, increased number and size of a bilateral pulmonary   nodules and masses, largest within the right upper lobe measuring 4.9 x   4.3 cm, consistent with worsening malignant disease.    CT ABDOMEN AND PELVIS WITH ORAL AND IV CONTRAST  No evidence of intra-abdominal metastases. Interval increase in size and   number of pulmonary nodules (please see CT chest report for additional   details of the findings above the diaphragm).  Colonic diverticulosis without evidence of diverticulitis.  5.3 cm infrarenal abdominal aortic aneurysm status post stent graft   repair.    ULTRASOUND DOPPLER OF THE LOWER EXTREMITIES  Mynor appearing DVT noted in the left popliteal vein.  Right thigh mass noted measuring 2 x 2 centimeters second mass is noted   measuring 2 x 3 cm                                            --------------------------------------------------------------    PHYSICAL EXAM:    GENERAL: No acute distress, afebrile, BP borderline at baseline. Alert. Oriented to people and location.   HEENT:  Sclera mildly injected, mucosa moist  PULMONARY: scattered bilateral rhonchi  CARDIOVASCULAR: Heart sounds regular   GASTROINTESTINAL: Soft, nondistended, bowel sounds audible   MUSCULOSKELETAL:  No lower extremity edema, positive peripheral pulses. Calves not warm / tender to touch   NEUROLOGY: Mild weakness right upper and lower extremities                                              --------------------------------------------------------------    ASSESSMENT & PLAN    77 M w/ PMHx  of htn, dld, cva x4 w/ r hemiparesis, poor historian who presents to the ed c/o generalized weakness, loss of appetite and burning on urination, found to have a positive MARCIE in the urine. Blood culture positive for coryneform Gram (+) rods. Active workup for bilateral lung nodules     # Metabolic encephalopathy, increased confusion : Encephalopathy likely secondary to UTI  - Blood culture on 09/03 positive for coryneform Gram (+) rods. Repeat culture was negative   - TTE performed : could not be read as patient was reflecting all the ultrasounds per echo tech   - ID consult : Maintain antibiotics off and follow-up Gram (+) organism and sensitivities   - Urine culture : > 100 000 CFU / mL MARCIE   - Abdominal ultrasound showed cholelithiasis with few echogenic foci nonmobile and no sonographic evidence of cholecystitis    # Bilateral lung masses on chest x ray : Known   - CT chest w/IV contrast + CT abdomen and pelvis w/oral and IV contrast performed : The CT scan of the chest showing increase number and size of the pulmonary nodules since   August 7, 2017. No evidence of intra-abdominal metastases on CT scan abdomen and pelvis   - Patient has a history of right lower extremity sarcoma   MRI : PATIENT HAS DIFFUSE BODY METAL PIECES FROM [place]  - Oncology consult placed  - Pulmonary consult : consider palliative care, may need IR guided biopsy depending on oncology recommendations     # HO HTN / DLD / CVA  - C/w home meds : Aspirin, Plavix, Atorvastatin, Clonidine     # DVT   - VA duplex of the lower extremities showed Mynor appearing DVT in the left popliteal vein. Right thigh mass measuring 2 x 2 centimeters second mass measuring 2 x 3 cm  - Patient on therapeutic Lovenox : 65 mg sc BID                                                                             ----------------------------------------------------------------------------------------    # DVT : Lovenox 65 mg sc BID     # GI prophylaxis : Protonix 40 mg daily     # Diet : DASH diet     # Activity : Increase as tolerated     # Code status : FULL     # Disposition : home, lives with wife                                                                             --------------------------------------------------------- TRAV GOVEA 77y Male  MRN#: [id number]   CODE STATUS:________      SUBJECTIVE  77 M w/ PMH of htn, dld, cva x4 w/ r hemiparesis, poor historian who presents to the ed c/o generalized weakness & loss of appetite. For the past 4 days the pt has not been getting out of bed & has decreased PO intake which per the wife is unusual for him and is not his baseline. Pt denies SOB, chest pain, abd pain, n/v/d, urinary complaints. No fever.     On admission a CT scan of the had no contrast was done and showed stable moderate chronic microvascular ischemic changes and no evidence of acute intracranial pathology. A chest x ray was done and revealed diffuse bilateral lung masses without significant changes since last chest x ray. UA done on admission was positive and the patient was started on Levaquin.    Blood culture grew gram positive rods and the patient was switched to vancomycin 1 g IV q12h, later discontinued per ID recommendations.. The patient is also undergoing workup for endocarditis and workup for malignancy as the chest x ray showed bilateral lung nodules (known, workup refused by wife in the past, agreeable currently) and CT scan of the thorax showing increase interval in size of the bilateral lung nodules, CT scan abdomen and pelvis negative for intra-abdominal metastases.      Overnight  No major events overnight  c/o diffuse pain, nonradiating, sharp, not worsened with movement.  no sob, fever, diarrhea.      Present Today:   - Odell X  - Drains X  - Central Line X                                            ----------------------------------------------------------  OBJECTIVE  PAST MEDICAL & SURGICAL HISTORY  Hypertension  CVA (cerebral vascular accident)                                            -----------------------------------------------------------  ALLERGIES:  penicillin (Unknown)                                            ------------------------------------------------------------  MEDICATIONS:  STANDING MEDICATIONS  aspirin  chewable 81 milliGRAM(s) Oral daily  atorvastatin 20 milliGRAM(s) Oral at bedtime  cloNIDine 0.1 milliGRAM(s) Oral two times a day  clopidogrel Tablet 75 milliGRAM(s) Oral daily  enoxaparin Injectable 65 milliGRAM(s) SubCutaneous two times a day  gabapentin 100 milliGRAM(s) Oral two times a day  magnesium oxide 400 milliGRAM(s) Oral daily  pantoprazole    Tablet 40 milliGRAM(s) Oral before breakfast  tamsulosin 0.4 milliGRAM(s) Oral at bedtime    PRN MEDICATIONS  ALBUTerol/ipratropium for Nebulization 3 milliLiter(s) Nebulizer every 6 hours PRN                                            ------------------------------------------------------------  VITAL SIGNS: Last 24 Hours  T(C): 36.2 (08 Sep 2018 04:50), Max: 37.5 (07 Sep 2018 13:00)  T(F): 97.1 (08 Sep 2018 04:50), Max: 99.5 (07 Sep 2018 13:00)  HR: 59 (08 Sep 2018 06:17) (59 - 81)  BP: 117/58 (08 Sep 2018 06:17) (94/60 - 122/72)  BP(mean): --  RR: 16 (08 Sep 2018 04:50) (16 - 16)  SpO2: 99% (07 Sep 2018 13:00) (99% - 99%)                                             --------------------------------------------------------------  LABS:                        11.6   4.52  )-----------( 184      ( 07 Sep 2018 08:50 )             33.9     09-07    139  |  103  |  10  ----------------------------<  134<H>  4.1   |  25  |  0.8    Ca    8.2<L>      07 Sep 2018 08:50  Phos  2.8     09-07  Mg     1.9     09-07    TPro  5.5<L>  /  Alb  3.1<L>  /  TBili  0.8  /  DBili  x   /  AST  28  /  ALT  10  /  AlkPhos  150<H>  09-07                Culture - Blood (collected 06 Sep 2018 12:20)  Source: .Blood None  Preliminary Report (08 Sep 2018 01:01):    No growth to date.                                                    -------------------------------------------------------------  RADIOLOGY:    CT HEAD NO CONTRAST  No CT evidence for acute intracranial pathology.  Chronic infarctions in the left frontal, parietal, and anterior temporal   lobe and chronic lacunar infarct within the posterior limb of the right   basal ganglia.  Stable moderate chronic microvascular ischemic changes    CHEST X RAY  Diffuse bilateral lung masses without significant change    CT CHEST, ABDOMEN AND PELVIS  Since August 7, 2017, increased number and size of a bilateral pulmonary   nodules and masses, largest within the right upper lobe measuring 4.9 x   4.3 cm, consistent with worsening malignant disease.    CT ABDOMEN AND PELVIS WITH ORAL AND IV CONTRAST  No evidence of intra-abdominal metastases. Interval increase in size and   number of pulmonary nodules (please see CT chest report for additional   details of the findings above the diaphragm).  Colonic diverticulosis without evidence of diverticulitis.  5.3 cm infrarenal abdominal aortic aneurysm status post stent graft   repair.    ULTRASOUND DOPPLER OF THE LOWER EXTREMITIES  Mynor appearing DVT noted in the left popliteal vein.  Right thigh mass noted measuring 2 x 2 centimeters second mass is noted   measuring 2 x 3 cm                                            --------------------------------------------------------------    PHYSICAL EXAM:    GENERAL: No acute distress, afebrile, BP borderline at baseline. Alert. Oriented to people (knows his wife's name).   HEENT:  Sclera injected  PULMONARY: Lung fields clear bilaterally  CARDIOVASCULAR: Heart sounds regular   GASTROINTESTINAL: Soft, nondistended, bowel sounds audible   MUSCULOSKELETAL:  No lower extremity edema, positive peripheral pulses. Calves not warm / tender to touch   NEUROLOGY: Mild weakness right upper and lower extremities                                              --------------------------------------------------------------    ASSESSMENT & PLAN    77 M w/ PMHx  of htn, dld, cva x4 w/ r hemiparesis, poor historian who presents to the ed c/o generalized weakness, loss of appetite and burning on urination, found to have a positive MARCIE in the urine. Blood culture positive for coryneform Gram (+) rods. Active workup for bilateral lung nodules     # Metabolic encephalopathy, increased confusion : Encephalopathy likely secondary to UTI  - Blood culture on 09/03 positive for coryneform Gram (+) rods. Repeat culture was negative   - TTE performed : could not be read as patient was reflecting all the ultrasounds per echo tech   - ID consult : Maintain antibiotics off and follow-up Gram (+) organism and sensitivities   - Urine culture : > 100 000 CFU / mL MARCIE   - Abdominal ultrasound showed cholelithiasis with few echogenic foci nonmobile and no sonographic evidence of cholecystitis    # Bilateral lung masses on chest x ray : Known   - CT chest w/IV contrast + CT abdomen and pelvis w/oral and IV contrast performed : The CT scan of the chest showing increase number and size of the pulmonary nodules since   August 7, 2017. No evidence of intra-abdominal metastases on CT scan abdomen and pelvis   - Patient has a history of right lower extremity sarcoma   MRI : PATIENT HAS DIFFUSE BODY METAL PIECES FROM [place]  - Oncology consult placed  - Pulmonary consult : consider palliative care, may need IR guided biopsy depending on oncology recommendations     # HO HTN / DLD / CVA  - C/w home meds : Aspirin, Plavix, Atorvastatin, Clonidine     # DVT   - VA duplex of the lower extremities showed Mynor appearing DVT in the left popliteal vein. Right thigh mass measuring 2 x 2 centimeters second mass measuring 2 x 3 cm  - Patient on therapeutic Lovenox : 65 mg sc BID                                                                             ----------------------------------------------------------------------------------------    # DVT : Lovenox 65 mg sc BID     # GI prophylaxis : Protonix 40 mg daily     # Diet : DASH diet     # Activity : Increase as tolerated     # Code status : FULL     # Disposition : home, lives with wife                                                                             ---------------------------------------------------------

## 2018-09-09 RX ADMIN — ENOXAPARIN SODIUM 65 MILLIGRAM(S): 100 INJECTION SUBCUTANEOUS at 17:26

## 2018-09-09 RX ADMIN — TAMSULOSIN HYDROCHLORIDE 0.4 MILLIGRAM(S): 0.4 CAPSULE ORAL at 21:33

## 2018-09-09 RX ADMIN — GABAPENTIN 100 MILLIGRAM(S): 400 CAPSULE ORAL at 17:26

## 2018-09-09 RX ADMIN — CLOPIDOGREL BISULFATE 75 MILLIGRAM(S): 75 TABLET, FILM COATED ORAL at 12:10

## 2018-09-09 RX ADMIN — MAGNESIUM OXIDE 400 MG ORAL TABLET 400 MILLIGRAM(S): 241.3 TABLET ORAL at 12:10

## 2018-09-09 RX ADMIN — PANTOPRAZOLE SODIUM 40 MILLIGRAM(S): 20 TABLET, DELAYED RELEASE ORAL at 05:31

## 2018-09-09 RX ADMIN — GABAPENTIN 100 MILLIGRAM(S): 400 CAPSULE ORAL at 05:31

## 2018-09-09 RX ADMIN — Medication 0.1 MILLIGRAM(S): at 05:31

## 2018-09-09 RX ADMIN — ATORVASTATIN CALCIUM 20 MILLIGRAM(S): 80 TABLET, FILM COATED ORAL at 21:33

## 2018-09-09 RX ADMIN — Medication 0.1 MILLIGRAM(S): at 17:26

## 2018-09-09 RX ADMIN — ENOXAPARIN SODIUM 65 MILLIGRAM(S): 100 INJECTION SUBCUTANEOUS at 05:32

## 2018-09-09 RX ADMIN — Medication 81 MILLIGRAM(S): at 12:10

## 2018-09-09 NOTE — PROGRESS NOTE ADULT - ASSESSMENT
77M PMHx HTN, CVA with hemiparesis here with metabolic encephalopathy. Lung mass with metastasis.     1. Encephalopathy  unclear etiology  in setting underlying malignancy  unable to obtain MRI due to shrapnel from [place]  2. Lung mass  f/u pulm for IR biopsy vs. bronch  onc to follow post biopsy  ongoing goals of care discussion  3. HTN  clonidine 0.1 bid  4. DVT	  lovenox  5. CVA  asa, plavix  lipitor 20  6. DVT ppx  lovenox  7. Anemia, chronic  trend

## 2018-09-09 NOTE — PROGRESS NOTE ADULT - SUBJECTIVE AND OBJECTIVE BOX
TRAV GOVEA  77y  Male      Patient is a 77y old  Male who presents with a chief complaint of UTI (08 Sep 2018 09:05)      INTERVAL HPI/OVERNIGHT EVENTS:  unable to obtain ros    Vital Signs Last 24 Hrs  T(C): 36.7 (09 Sep 2018 00:50), Max: 37.2 (08 Sep 2018 20:38)  T(F): 98.1 (09 Sep 2018 00:50), Max: 98.9 (08 Sep 2018 20:38)  HR: 69 (09 Sep 2018 00:50) (65 - 69)  BP: 109/27 (09 Sep 2018 00:50) (109/27 - 122/59)  BP(mean): --  RR: 16 (09 Sep 2018 00:50) (16 - 16)  SpO2: 94% (09 Sep 2018 07:40) (94% - 94%)    PHYSICAL EXAM:  GENERAL: NAD, well-groomed, well-developed  HEAD:  Atraumatic, Normocephalic    CHEST/LUNG: Clear to auscultation bilaterally; No rales, rhonchi, wheezing, or rubs  HEART: Regular rate and rhythm; No murmurs, rubs, or gallops  ABDOMEN: Soft, Nontender, Nondistended; Bowel sounds present  EXTREMITIES:  2+ Peripheral Pulses, No clubbing, cyanosis, or edema      Consultant(s) Notes Reviewed:  [x ] YES  [ ] NO  Care Discussed with Consultants/Other Providers [ x] YES  [ ] NO    LABS:                        10.5   4.54  )-----------( 166      ( 08 Sep 2018 11:33 )             31.0     09-08    137  |  102  |  10  ----------------------------<  159<H>  4.3   |  23  |  0.8    Ca    7.7<L>      08 Sep 2018 11:33  Phos  2.7     09-08  Mg     2.0     09-08    TPro  5.3<L>  /  Alb  2.9<L>  /  TBili  0.6  /  DBili  x   /  AST  38  /  ALT  15  /  AlkPhos  177<H>  09-08          CAPILLARY BLOOD GLUCOSE          RADIOLOGY & ADDITIONAL TESTS:    Imaging Personally Reviewed:  [ ] YES  [ ] NO

## 2018-09-10 RX ORDER — SODIUM CHLORIDE 9 MG/ML
1000 INJECTION INTRAMUSCULAR; INTRAVENOUS; SUBCUTANEOUS
Qty: 0 | Refills: 0 | Status: DISCONTINUED | OUTPATIENT
Start: 2018-09-10 | End: 2018-09-12

## 2018-09-10 RX ADMIN — GABAPENTIN 100 MILLIGRAM(S): 400 CAPSULE ORAL at 06:08

## 2018-09-10 RX ADMIN — TAMSULOSIN HYDROCHLORIDE 0.4 MILLIGRAM(S): 0.4 CAPSULE ORAL at 23:29

## 2018-09-10 RX ADMIN — PANTOPRAZOLE SODIUM 40 MILLIGRAM(S): 20 TABLET, DELAYED RELEASE ORAL at 06:08

## 2018-09-10 RX ADMIN — CLOPIDOGREL BISULFATE 75 MILLIGRAM(S): 75 TABLET, FILM COATED ORAL at 11:22

## 2018-09-10 RX ADMIN — GABAPENTIN 100 MILLIGRAM(S): 400 CAPSULE ORAL at 17:23

## 2018-09-10 RX ADMIN — MAGNESIUM OXIDE 400 MG ORAL TABLET 400 MILLIGRAM(S): 241.3 TABLET ORAL at 11:22

## 2018-09-10 RX ADMIN — ATORVASTATIN CALCIUM 20 MILLIGRAM(S): 80 TABLET, FILM COATED ORAL at 23:29

## 2018-09-10 RX ADMIN — Medication 81 MILLIGRAM(S): at 11:22

## 2018-09-10 RX ADMIN — ENOXAPARIN SODIUM 65 MILLIGRAM(S): 100 INJECTION SUBCUTANEOUS at 17:23

## 2018-09-10 RX ADMIN — Medication 0.1 MILLIGRAM(S): at 17:23

## 2018-09-10 RX ADMIN — SODIUM CHLORIDE 50 MILLILITER(S): 9 INJECTION INTRAMUSCULAR; INTRAVENOUS; SUBCUTANEOUS at 12:40

## 2018-09-10 RX ADMIN — Medication 0.1 MILLIGRAM(S): at 06:08

## 2018-09-10 NOTE — PROGRESS NOTE ADULT - ASSESSMENT
IMPRESSION:  Primary problem seems to be a progressive neoplastic process with CXR form 2014 being abnormal and scince then has progressed with multipe large diffuse nodular masses  in the lungs bilaterally.  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  Asumptomatic bacturia with MARCIE with no evidence of endocarditis.  No pyelonephritis  Blood cultures contaminated with gram positive rods.  Repeat blood cultures are negative  No evidence of sepsis with encephalopathy  CT head with no mets    RECOMMENDATIONS:  Neoplastic work up.  Maintain off antibiotics.

## 2018-09-10 NOTE — CONSULT NOTE ADULT - SUBJECTIVE AND OBJECTIVE BOX
Patient is a 77y old  Male who presents with a chief complaint of UTI (10 Sep 2018 10:05)      HPI:  77 M w/ PMH of htn, dld, cva x4 w/ r hemiparesis, poor historian who presents to the ed c/o generalized weakness & loss of appetite. For the past 4 days the pt has not been getting out of bed & has decreased PO intake which per the wife is unusual for him and is not his baseline. Pt denies SOB, chest pain, abd pain, n/v/d, urinary complaints. No fever.     On admission a CT scan of the had no contrast was done and showed stable moderate chronic microvascular ischemic changes and no evidence of acute intracranial pathology. A chest x ray was done and revealed diffuse bilateral lung masses without significant changes since last chest x ray. UA done on admission was positive and the patient was started on Levaquin.    Blood culture grew gram positive rods and the patient was switched to vancomycin 1 g IV q12h, later discontinued per ID recommendations.. The patient is also undergoing workup for endocarditis and workup for malignancy as the chest x ray showed bilateral lung nodules (known, workup refused by wife in the past, agreeable currently) and CT scan of the thorax showing increase interval in size of the bilateral lung nodules, CT scan abdomen and pelvis negative for intra-abdominal metastases.          PAST MEDICAL & SURGICAL HISTORY:  Hypertension  CVA (cerebral vascular accident)      SOCIAL HISTORY:    FAMILY HISTORY:  No pertinent family history in first degree relatives      MEDICATIONS  (STANDING):  aspirin  chewable 81 milliGRAM(s) Oral daily  atorvastatin 20 milliGRAM(s) Oral at bedtime  cloNIDine 0.1 milliGRAM(s) Oral two times a day  clopidogrel Tablet 75 milliGRAM(s) Oral daily  enoxaparin Injectable 65 milliGRAM(s) SubCutaneous two times a day  gabapentin 100 milliGRAM(s) Oral two times a day  magnesium oxide 400 milliGRAM(s) Oral daily  pantoprazole    Tablet 40 milliGRAM(s) Oral before breakfast  sodium chloride 0.9%. 1000 milliLiter(s) (50 mL/Hr) IV Continuous <Continuous>  tamsulosin 0.4 milliGRAM(s) Oral at bedtime    MEDICATIONS  (PRN):  ALBUTerol/ipratropium for Nebulization 3 milliLiter(s) Nebulizer every 6 hours PRN Shortness of Breath and/or Wheezing      Allergies    penicillin (Unknown)    Intolerances        Vital Signs Last 24 Hrs  T(C): 36.7 (10 Sep 2018 05:00), Max: 37.4 (09 Sep 2018 20:45)  T(F): 98.1 (10 Sep 2018 05:00), Max: 99.3 (09 Sep 2018 20:45)  HR: 68 (10 Sep 2018 05:00) (66 - 68)  BP: 123/58 (10 Sep 2018 05:00) (102/58 - 123/58)  BP(mean): --  RR: 18 (10 Sep 2018 05:00) (18 - 18)  SpO2: --    PHYSICAL EXAM  GENERAL: NAD, well-groomed, well-developed  HEAD:  Atraumatic, Normocephalic  CHEST/LUNG: Clear to auscultation bilaterally; No rales, rhonchi, wheezing, or rubs  HEART: Regular rate and rhythm; No murmurs, rubs, or gallops  ABDOMEN: Soft, Nontender, Nondistended; Bowel sounds present  EXTREMITIES:  2+ Peripheral Pulses, No clubbing, cyanosis, or edema      LABS:      RADIOLOGY :  < from: CT Abdomen and Pelvis w/ Oral Cont and w/ IV Cont (09.06.18 @ 10:44) >  No evidence of intra-abdominal metastases. Interval increase in size and   number of pulmonary nodules (please see CT chest report for additional   details of the findings above the diaphragm).    Colonic diverticulosis without evidence of diverticulitis.    5.3 cm infrarenal abdominal aortic aneurysm status post stent graft   repair.      < end of copied text >  < from: CT Chest w/ IV Cont (09.06.18 @ 10:44) >  LUNGS, PLEURA AND AIRWAYS: Increased number and size of innumerable   bilateral pulmonary nodules and masses, largest within the right upper   lobe measuring 4.9 x 4.3 cm (series 4, image 47, and the left lower lobe   measuring 3.8 x 3.0 cm (series 4, image 171). Interval development of   right upper lobe patchy airspace consolidations. Mild upper lobe   predominant centrilobular emphysematous changes. No obstructing   endobronchial lesions, pleural effusions or pneumothorax.    MEDIASTINUM/LYMPH NODES: Right hilar nodule measuring 2.7 x 1.8 cm   (series 4, image 101). Stable enlarged 1.5 cm paraesophageal lymph node   (series 4, image 117).    HEART/GREAT VESSELS: No pericardial effusion. Thoracic aorta and main   pulmonary arteries are normal in caliber. Aortic arch demonstrates normal   branching pattern.Atherosclerotic calcifications involving the aorta and   coronary arteries.    BONES/SOFT TISSUES: No suspicious osseous lesions. Diffuse osteopenia and   multilevel degenerative changes of the thoracic spine.    VISUALIZED UPPER ABDOMEN: See concurrently dictated report for CT abdomen   and pelvis.      IMPRESSION:    Since August 7, 2017, increased number and size of a bilateral pulmonary   nodules and masses, largest within the right upper lobe measuring 4.9 x   4.3 cm, consistent with worsening malignant disease.    < end of copied text >  < from: CT Head No Cont (09.02.18 @ 17:47) >  No CT evidence for acute intracranial pathology.    Chronic infarctions in the left frontal, parietal, and anterior temporal   lobe and chronic lacunar infarct within the posterior limb of the right   basal ganglia.    Stable moderate chronic microvascular ischemic changes Patient is a 77y old  Male who presents with a chief complaint of UTI (10 Sep 2018 10:05)      HPI:  77 M w/ PMH of htn, dld, cva x4 w/ r hemiparesis, poor historian who presents to the ed c/o generalized weakness & loss of appetite. For the past 4 days the pt has not been getting out of bed & has decreased PO intake which per the wife is unusual for him and is not his baseline. Pt denies SOB, chest pain, abd pain, n/v/d, urinary complaints. No fever.     On admission a CT scan of the had no contrast was done and showed stable moderate chronic microvascular ischemic changes and no evidence of acute intracranial pathology. A chest x ray was done and revealed diffuse bilateral lung masses without significant changes since last chest x ray. UA done on admission was positive and the patient was started on Levaquin.    Blood culture grew gram positive rods and the patient was switched to vancomycin 1 g IV q12h, later discontinued per ID recommendations.. The patient is also undergoing workup for endocarditis and workup for malignancy as the chest x ray showed bilateral lung nodules (known, workup refused by wife in the past, agreeable currently) and CT scan of the thorax showing increase interval in size of the bilateral lung nodules, CT scan abdomen and pelvis negative for intra-abdominal metastases.          PAST MEDICAL & SURGICAL HISTORY:  Hypertension  CVA (cerebral vascular accident)      SOCIAL HISTORY:    FAMILY HISTORY:  No pertinent family history in first degree relatives      MEDICATIONS  (STANDING):  aspirin  chewable 81 milliGRAM(s) Oral daily  atorvastatin 20 milliGRAM(s) Oral at bedtime  cloNIDine 0.1 milliGRAM(s) Oral two times a day  clopidogrel Tablet 75 milliGRAM(s) Oral daily  enoxaparin Injectable 65 milliGRAM(s) SubCutaneous two times a day  gabapentin 100 milliGRAM(s) Oral two times a day  magnesium oxide 400 milliGRAM(s) Oral daily  pantoprazole    Tablet 40 milliGRAM(s) Oral before breakfast  sodium chloride 0.9%. 1000 milliLiter(s) (50 mL/Hr) IV Continuous <Continuous>  tamsulosin 0.4 milliGRAM(s) Oral at bedtime    MEDICATIONS  (PRN):  ALBUTerol/ipratropium for Nebulization 3 milliLiter(s) Nebulizer every 6 hours PRN Shortness of Breath and/or Wheezing      Allergies    penicillin (Unknown)    Intolerances        Vital Signs Last 24 Hrs  T(C): 36.7 (10 Sep 2018 05:00), Max: 37.4 (09 Sep 2018 20:45)  T(F): 98.1 (10 Sep 2018 05:00), Max: 99.3 (09 Sep 2018 20:45)  HR: 68 (10 Sep 2018 05:00) (66 - 68)  BP: 123/58 (10 Sep 2018 05:00) (102/58 - 123/58)  BP(mean): --  RR: 18 (10 Sep 2018 05:00) (18 - 18)  SpO2: --    PHYSICAL EXAM  GENERAL: NAD  HEAD:  Atraumatic, Normocephalic  CHEST/LUNG: Clear to auscultation bilaterally; No rales, rhonchi, wheezing, or rubs  HEART: Regular rate and rhythm; No murmurs, rubs, or gallops  ABDOMEN: Soft, Nontender, Nondistended; Bowel sounds present  EXTREMITIES:  2+ Peripheral Pulses, No clubbing, cyanosis, or edema  lula- aao x1-2      LABS:      RADIOLOGY :  < from: CT Abdomen and Pelvis w/ Oral Cont and w/ IV Cont (09.06.18 @ 10:44) >  No evidence of intra-abdominal metastases. Interval increase in size and   number of pulmonary nodules (please see CT chest report for additional   details of the findings above the diaphragm).    Colonic diverticulosis without evidence of diverticulitis.    5.3 cm infrarenal abdominal aortic aneurysm status post stent graft   repair.      < end of copied text >  < from: CT Chest w/ IV Cont (09.06.18 @ 10:44) >  LUNGS, PLEURA AND AIRWAYS: Increased number and size of innumerable   bilateral pulmonary nodules and masses, largest within the right upper   lobe measuring 4.9 x 4.3 cm (series 4, image 47, and the left lower lobe   measuring 3.8 x 3.0 cm (series 4, image 171). Interval development of   right upper lobe patchy airspace consolidations. Mild upper lobe   predominant centrilobular emphysematous changes. No obstructing   endobronchial lesions, pleural effusions or pneumothorax.    MEDIASTINUM/LYMPH NODES: Right hilar nodule measuring 2.7 x 1.8 cm   (series 4, image 101). Stable enlarged 1.5 cm paraesophageal lymph node   (series 4, image 117).    HEART/GREAT VESSELS: No pericardial effusion. Thoracic aorta and main   pulmonary arteries are normal in caliber. Aortic arch demonstrates normal   branching pattern.Atherosclerotic calcifications involving the aorta and   coronary arteries.    BONES/SOFT TISSUES: No suspicious osseous lesions. Diffuse osteopenia and   multilevel degenerative changes of the thoracic spine.    VISUALIZED UPPER ABDOMEN: See concurrently dictated report for CT abdomen   and pelvis.      IMPRESSION:    Since August 7, 2017, increased number and size of a bilateral pulmonary   nodules and masses, largest within the right upper lobe measuring 4.9 x   4.3 cm, consistent with worsening malignant disease.    < end of copied text >  < from: CT Head No Cont (09.02.18 @ 17:47) >  No CT evidence for acute intracranial pathology.    Chronic infarctions in the left frontal, parietal, and anterior temporal   lobe and chronic lacunar infarct within the posterior limb of the right   basal ganglia.    Stable moderate chronic microvascular ischemic changes Patient is a 77y old  Male who presents with a chief complaint of UTI (10 Sep 2018 10:05)      HPI:  77 M w/ PMH of htn, dld, cva x4 w/ r hemiparesis,dementia, poor historian who presents to the ed c/o generalized weakness & loss of appetite. For the past 4 days the pt has not been getting out of bed & has decreased PO intake which per the wife is unusual for him and is not his baseline. Pt denies SOB, chest pain, abd pain, n/v/d, urinary complaints. No fever.     On admission a CT scan of the had no contrast was done and showed stable moderate chronic microvascular ischemic changes and no evidence of acute intracranial pathology. A chest x ray was done and revealed diffuse bilateral lung masses without significant changes since last chest x ray. UA done on admission was positive and the patient was started on Levaquin.    Blood culture grew gram positive rods and the patient was switched to vancomycin 1 g IV q12h, later discontinued per ID recommendations.. The patient is also undergoing workup for endocarditis and workup for malignancy as the chest x ray showed bilateral lung nodules (known, workup refused by wife in the past, agreeable currently) and CT scan of the thorax showing increase interval in size of the bilateral lung nodules, CT scan abdomen and pelvis negative for intra-abdominal metastases.          PAST MEDICAL & SURGICAL HISTORY:  Hypertension  CVA (cerebral vascular accident)      SOCIAL HISTORY:    FAMILY HISTORY:  No pertinent family history in first degree relatives      MEDICATIONS  (STANDING):  aspirin  chewable 81 milliGRAM(s) Oral daily  atorvastatin 20 milliGRAM(s) Oral at bedtime  cloNIDine 0.1 milliGRAM(s) Oral two times a day  clopidogrel Tablet 75 milliGRAM(s) Oral daily  enoxaparin Injectable 65 milliGRAM(s) SubCutaneous two times a day  gabapentin 100 milliGRAM(s) Oral two times a day  magnesium oxide 400 milliGRAM(s) Oral daily  pantoprazole    Tablet 40 milliGRAM(s) Oral before breakfast  sodium chloride 0.9%. 1000 milliLiter(s) (50 mL/Hr) IV Continuous <Continuous>  tamsulosin 0.4 milliGRAM(s) Oral at bedtime    MEDICATIONS  (PRN):  ALBUTerol/ipratropium for Nebulization 3 milliLiter(s) Nebulizer every 6 hours PRN Shortness of Breath and/or Wheezing      Allergies    penicillin (Unknown)    Intolerances        Vital Signs Last 24 Hrs  T(C): 36.7 (10 Sep 2018 05:00), Max: 37.4 (09 Sep 2018 20:45)  T(F): 98.1 (10 Sep 2018 05:00), Max: 99.3 (09 Sep 2018 20:45)  HR: 68 (10 Sep 2018 05:00) (66 - 68)  BP: 123/58 (10 Sep 2018 05:00) (102/58 - 123/58)  BP(mean): --  RR: 18 (10 Sep 2018 05:00) (18 - 18)  SpO2: --    PHYSICAL EXAM  GENERAL: NAD  HEAD:  Atraumatic, Normocephalic  CHEST/LUNG: Clear to auscultation bilaterally; No rales, rhonchi, wheezing, or rubs  HEART: Regular rate and rhythm; No murmurs, rubs, or gallops  ABDOMEN: Soft, Nontender, Nondistended; Bowel sounds present  EXTREMITIES:  2+ Peripheral Pulses, No clubbing, cyanosis, or edema  lula- aao x1-2      LABS:      RADIOLOGY :  < from: CT Abdomen and Pelvis w/ Oral Cont and w/ IV Cont (09.06.18 @ 10:44) >  No evidence of intra-abdominal metastases. Interval increase in size and   number of pulmonary nodules (please see CT chest report for additional   details of the findings above the diaphragm).    Colonic diverticulosis without evidence of diverticulitis.    5.3 cm infrarenal abdominal aortic aneurysm status post stent graft   repair.      < end of copied text >  < from: CT Chest w/ IV Cont (09.06.18 @ 10:44) >  LUNGS, PLEURA AND AIRWAYS: Increased number and size of innumerable   bilateral pulmonary nodules and masses, largest within the right upper   lobe measuring 4.9 x 4.3 cm (series 4, image 47, and the left lower lobe   measuring 3.8 x 3.0 cm (series 4, image 171). Interval development of   right upper lobe patchy airspace consolidations. Mild upper lobe   predominant centrilobular emphysematous changes. No obstructing   endobronchial lesions, pleural effusions or pneumothorax.    MEDIASTINUM/LYMPH NODES: Right hilar nodule measuring 2.7 x 1.8 cm   (series 4, image 101). Stable enlarged 1.5 cm paraesophageal lymph node   (series 4, image 117).    HEART/GREAT VESSELS: No pericardial effusion. Thoracic aorta and main   pulmonary arteries are normal in caliber. Aortic arch demonstrates normal   branching pattern.Atherosclerotic calcifications involving the aorta and   coronary arteries.    BONES/SOFT TISSUES: No suspicious osseous lesions. Diffuse osteopenia and   multilevel degenerative changes of the thoracic spine.    VISUALIZED UPPER ABDOMEN: See concurrently dictated report for CT abdomen   and pelvis.      IMPRESSION:    Since August 7, 2017, increased number and size of a bilateral pulmonary   nodules and masses, largest within the right upper lobe measuring 4.9 x   4.3 cm, consistent with worsening malignant disease.    < end of copied text >  < from: CT Head No Cont (09.02.18 @ 17:47) >  No CT evidence for acute intracranial pathology.    Chronic infarctions in the left frontal, parietal, and anterior temporal   lobe and chronic lacunar infarct within the posterior limb of the right   basal ganglia.    Stable moderate chronic microvascular ischemic changes

## 2018-09-10 NOTE — CONSULT NOTE ADULT - ASSESSMENT
77M PMHx HTN, CVA with hemiparesis here with metabolic encephalopathy. Lung mass with metastasis.     1) LUNG MASS 77M PMHx HTN, CVA with hemiparesis here with metabolic encephalopathy. Lung mass     1) LUNG MASS  will need IR guided biopsy   will f/u after results of biopsy  needs ct head with contrast to r/o metastasis since mri cannot be done   unable to reach wife, will contact again 77M PMHx HTN, CVA with hemiparesis here with metabolic encephalopathy. Lung mass     1) Lung mass in 2 different sites with lymph node involvement   will need IR guided biopsy   will f/u after results of biopsy  needs ct head with contrast to r/o metastasis since mri cannot be done   unable to reach wife, will contact again   palliative consult  check psa, cea and [id number]

## 2018-09-10 NOTE — DIETITIAN INITIAL EVALUATION ADULT. - OTHER INFO
Initial assessment for LOS p/w: UTI.  Encephalopathy: in setting underlying malignancy (lung CA).  Lung mass: f/u pulm for IR biopsy vs. bronch.  Ongoing goals of care discussion.

## 2018-09-10 NOTE — PROGRESS NOTE ADULT - ASSESSMENT
TRAV GOVEA 77y Male  MRN#: [id number]   CODE STATUS:FULLCODE      SUBJECTIVE  Patient is a 77y old Male who presents with a chief complaint of UTI (10 Sep 2018 13:27)  Currently admitted to medicine with the primary diagnosis of UTI (urinary tract infection)  Today is hospital day 8d, and this morning he is looking weak and in pain, unable to answer questions, couldn't get any information regarding his lung nodules         OBJECTIVE  PAST MEDICAL & SURGICAL HISTORY  Hypertension  CVA (cerebral vascular accident)    ALLERGIES:  penicillin (Unknown)    MEDICATIONS:  STANDING MEDICATIONS  aspirin  chewable 81 milliGRAM(s) Oral daily  atorvastatin 20 milliGRAM(s) Oral at bedtime  cloNIDine 0.1 milliGRAM(s) Oral two times a day  clopidogrel Tablet 75 milliGRAM(s) Oral daily  enoxaparin Injectable 65 milliGRAM(s) SubCutaneous two times a day  gabapentin 100 milliGRAM(s) Oral two times a day  magnesium oxide 400 milliGRAM(s) Oral daily  pantoprazole    Tablet 40 milliGRAM(s) Oral before breakfast  sodium chloride 0.9%. 1000 milliLiter(s) IV Continuous <Continuous>  tamsulosin 0.4 milliGRAM(s) Oral at bedtime    PRN MEDICATIONS  ALBUTerol/ipratropium for Nebulization 3 milliLiter(s) Nebulizer every 6 hours PRN      VITAL SIGNS: Last 24 Hours  T(C): 36.4 (10 Sep 2018 13:38), Max: 37.4 (09 Sep 2018 20:45)  T(F): 97.6 (10 Sep 2018 13:38), Max: 99.3 (09 Sep 2018 20:45)  HR: 67 (10 Sep 2018 13:38) (66 - 68)  BP: 93/53 (10 Sep 2018 13:38) (93/53 - 123/58)  BP(mean): --  RR: 18 (10 Sep 2018 13:38) (18 - 18)  SpO2: --    LABS:              RADIOLOGY:      PHYSICAL EXAM:    GENERAL: No acute distress, afebrile, BP borderline at baseline. Alert. Oriented to people (knows his wife's name).   HEENT:  Sclera injected  PULMONARY: Lung fields clear bilaterally  CARDIOVASCULAR: Heart sounds regular   GASTROINTESTINAL: Soft, nondistended, bowel sounds audible   MUSCULOSKELETAL:  No lower extremity edema, positive peripheral pulses. Calves not warm / tender to touch   NEUROLOGY: Mild weakness right upper and lower extremities       ADMISSION SUMMARY  Patient is a 77y old Male who presents with a chief complaint of UTI (10 Sep 2018 13:27)  Currently admitted to medicine with the primary diagnosis of UTI (urinary tract infection)         ASSESSMENT & PLAN      77 M w/ PMHx  of htn, dld, cva x4 w/ r hemiparesis, poor historian who presents to the ed c/o generalized weakness, loss of appetite and burning on urination, found to have a positive MARCIE in the urine. Blood culture positive for coryneform Gram (+) rods. Active workup for bilateral lung nodules     # Metabolic encephalopathy, increased confusion :     Encephalopathy likely secondary to UTI  - Blood culture on 09/03 positive for coryneform Gram (+) rods. Repeat culture was negative   - TTE performed : could not be read as patient was reflecting all the ultrasounds per echo tech   - ID consult : Maintain antibiotics off and follow-up Gram (+) organism and sensitivities   - Urine culture : > 100 000 CFU / mL MARCIE   - Abdominal ultrasound showed cholelithiasis with few echogenic foci nonmobile and no sonographic evidence of cholecystitis      # Bilateral lung masses on chest x ray : Known   - CT chest w/IV contrast + CT abdomen and pelvis w/oral and IV contrast performed : The CT scan of the chest showing increase number and size of the pulmonary nodules since   August 7, 2017. No evidence of intra-abdominal metastases on CT scan abdomen and pelvis   - Patient has a history of right lower extremity sarcoma   MRI : PATIENT HAS DIFFUSE BODY METAL PIECES FROM [place]  - Oncology consult placed  - Pulmonary consult : consider palliative care,   - IR for bx  - onco will f/u after bx  -PSA, CEA,     # HO HTN / DLD / CVA  - C/w home meds : Aspirin, Plavix, Atorvastatin, Clonidine     # DVT   - VA duplex of the lower extremities showed Mynor appearing DVT in the left popliteal vein. Right thigh mass measuring 2 x 2 centimeters second mass measuring 2 x 3 cm  - Patient on therapeutic Lovenox : 65 mg sc BID      # DVT : Lovenox 65 mg sc BID     # GI prophylaxis : Protonix 40 mg daily     # Diet : DASH diet     # Activity : Increase as tolerated     # Code status : FULL     # Disposition : home, lives with wife

## 2018-09-10 NOTE — PROGRESS NOTE ADULT - SUBJECTIVE AND OBJECTIVE BOX
TRAV GOVEA  77y, Male      OVERNIGHT EVENTS:    no fevers, more alert, responsive    VITALS:  T(F): 98.1, Max: 99.3 (09-09-18 @ 20:45)  HR: 68  BP: 123/58  RR: 18Vital Signs Last 24 Hrs  T(C): 36.7 (10 Sep 2018 05:00), Max: 37.4 (09 Sep 2018 20:45)  T(F): 98.1 (10 Sep 2018 05:00), Max: 99.3 (09 Sep 2018 20:45)  HR: 68 (10 Sep 2018 05:00) (66 - 88)  BP: 123/58 (10 Sep 2018 05:00) (102/58 - 123/58)  BP(mean): --  RR: 18 (10 Sep 2018 05:00) (16 - 18)  SpO2: --    TESTS & MEASUREMENTS:                        10.5   4.54  )-----------( 166      ( 08 Sep 2018 11:33 )             31.0     09-08    137  |  102  |  10  ----------------------------<  159<H>  4.3   |  23  |  0.8    Ca    7.7<L>      08 Sep 2018 11:33  Phos  2.7     09-08  Mg     2.0     09-08    TPro  5.3<L>  /  Alb  2.9<L>  /  TBili  0.6  /  DBili  x   /  AST  38  /  ALT  15  /  AlkPhos  177<H>  09-08    LIVER FUNCTIONS - ( 08 Sep 2018 11:33 )  Alb: 2.9 g/dL / Pro: 5.3 g/dL / ALK PHOS: 177 U/L / ALT: 15 U/L / AST: 38 U/L / GGT: x             Culture - Blood (collected 09-07-18 @ 08:50)  Source: .Blood None  Preliminary Report (09-08-18 @ 16:02):    No growth to date.    Culture - Blood (collected 09-06-18 @ 12:20)  Source: .Blood None  Preliminary Report (09-08-18 @ 01:01):    No growth to date.            RADIOLOGY & ADDITIONAL TESTS:    ANTIBIOTICS:

## 2018-09-10 NOTE — CONSULT NOTE ADULT - ATTENDING COMMENTS
78 yo male with multiple medical problems was found to have multiple b/l lung nodules with largest masses in RUL and LLL, highly suspicious for primary lung cancer.    -- Given his compromised medical condition, should discuss with his family about extent of workup they want to proceed. The patient is bed bounded with h/o CVA, dementia and right side hemiparesis.  -- If the family want to proceed with diagnostic procedure, may call IR for CT guided lung biopsy.  -- May check tumor markers although this is most likely primary lung cancer.

## 2018-09-11 LAB
ANION GAP SERPL CALC-SCNC: 9 MMOL/L — SIGNIFICANT CHANGE UP (ref 7–14)
BUN SERPL-MCNC: 9 MG/DL — LOW (ref 10–20)
CALCIUM SERPL-MCNC: 7.4 MG/DL — LOW (ref 8.5–10.1)
CANCER AG19-9 SERPL-ACNC: 31.8 U/ML — SIGNIFICANT CHANGE UP
CEA SERPL-MCNC: 2.3 NG/ML — SIGNIFICANT CHANGE UP (ref 0–3.8)
CHLORIDE SERPL-SCNC: 105 MMOL/L — SIGNIFICANT CHANGE UP (ref 98–110)
CO2 SERPL-SCNC: 22 MMOL/L — SIGNIFICANT CHANGE UP (ref 17–32)
CREAT SERPL-MCNC: 0.7 MG/DL — SIGNIFICANT CHANGE UP (ref 0.7–1.5)
GLUCOSE SERPL-MCNC: 104 MG/DL — HIGH (ref 70–99)
POTASSIUM SERPL-MCNC: 4.4 MMOL/L — SIGNIFICANT CHANGE UP (ref 3.5–5)
POTASSIUM SERPL-SCNC: 4.4 MMOL/L — SIGNIFICANT CHANGE UP (ref 3.5–5)
PSA FREE FLD-MCNC: 0.04 NG/ML — SIGNIFICANT CHANGE UP
PSA FREE FLD-MCNC: 33.3 — SIGNIFICANT CHANGE UP
PSA FREE MFR FLD: 0.12 NG/ML — SIGNIFICANT CHANGE UP (ref 0–4)
SODIUM SERPL-SCNC: 136 MMOL/L — SIGNIFICANT CHANGE UP (ref 135–146)

## 2018-09-11 RX ADMIN — MAGNESIUM OXIDE 400 MG ORAL TABLET 400 MILLIGRAM(S): 241.3 TABLET ORAL at 11:05

## 2018-09-11 RX ADMIN — ENOXAPARIN SODIUM 65 MILLIGRAM(S): 100 INJECTION SUBCUTANEOUS at 17:02

## 2018-09-11 RX ADMIN — Medication 81 MILLIGRAM(S): at 11:05

## 2018-09-11 RX ADMIN — CLOPIDOGREL BISULFATE 75 MILLIGRAM(S): 75 TABLET, FILM COATED ORAL at 11:05

## 2018-09-11 RX ADMIN — GABAPENTIN 100 MILLIGRAM(S): 400 CAPSULE ORAL at 17:01

## 2018-09-11 RX ADMIN — ENOXAPARIN SODIUM 65 MILLIGRAM(S): 100 INJECTION SUBCUTANEOUS at 05:27

## 2018-09-11 RX ADMIN — Medication 0.1 MILLIGRAM(S): at 05:26

## 2018-09-11 RX ADMIN — PANTOPRAZOLE SODIUM 40 MILLIGRAM(S): 20 TABLET, DELAYED RELEASE ORAL at 05:27

## 2018-09-11 RX ADMIN — GABAPENTIN 100 MILLIGRAM(S): 400 CAPSULE ORAL at 05:27

## 2018-09-11 NOTE — PROGRESS NOTE ADULT - SUBJECTIVE AND OBJECTIVE BOX
TRAV GOVEA  77y  Male      Patient is a 77y old  Male who presents with a chief complaint of lung nodules/confusion      INTERVAL HPI/OVERNIGHT EVENTS: feels lousy. having dry cough. denies pain      REVIEW OF SYSTEMS:  as above  All other review of systems negative    T(C): 36.2 (09-11-18 @ 13:29), Max: 36.6 (09-11-18 @ 05:10)  HR: 64 (09-11-18 @ 13:29) (64 - 71)  BP: 92/52 (09-11-18 @ 13:29) (92/52 - 125/59)  RR: 19 (09-11-18 @ 13:29) (18 - 19)  SpO2: --  Wt(kg): --Vital Signs Last 24 Hrs  T(C): 36.2 (11 Sep 2018 13:29), Max: 36.6 (11 Sep 2018 05:10)  T(F): 97.2 (11 Sep 2018 13:29), Max: 97.8 (11 Sep 2018 05:10)  HR: 64 (11 Sep 2018 13:29) (64 - 71)  BP: 92/52 (11 Sep 2018 13:29) (92/52 - 125/59)  BP(mean): --  RR: 19 (11 Sep 2018 13:29) (18 - 19)  SpO2: --      09-10-18 @ 07:01  -  09-11-18 @ 07:00  --------------------------------------------------------  IN: 300 mL / OUT: 0 mL / NET: 300 mL    09-11-18 @ 07:01  -  09-11-18 @ 17:07  --------------------------------------------------------  IN: 1000 mL / OUT: 0 mL / NET: 1000 mL        PHYSICAL EXAM:  GENERAL: NAD, frail  PSYCH: no agitation, baseline mentation  NERVOUS SYSTEM:  Alert & Oriented X3, no new focal deficits; RUE contracted, 4/5 Str on 4, a bit confused  PULMONARY: r sided rhonchi  CARDIOVASCULAR: Regular rate and rhythm; No murmurs, rubs, or gallops  GI: Soft, Nontender, Nondistended; Bowel sounds present  EXTREMITIES:  2+ Peripheral Pulses, No clubbing, cyanosis, or edema  skin dry    Consultant(s) Notes Reviewed:  [x ] YES  [ ] NO    Discussed with Consultants/Other Providers [ x] YES     LABS      09-11    136  |  105  |  9<L>  ----------------------------<  104<H>  4.4   |  22  |  0.7    Ca    7.4<L>      11 Sep 2018 13:25            Lactate Trend        CAPILLARY BLOOD GLUCOSE            RADIOLOGY & ADDITIONAL TESTS:    Imaging Personally Reviewed:  [ ] YES  [ ] NO    HEALTH ISSUES - PROBLEM Dx:

## 2018-09-11 NOTE — PROGRESS NOTE ADULT - ASSESSMENT
77M PMHx HTN, CVA with hemiparesis here with functional debility, + L popliteal DVT, found to have bilateral lung nodules     1. Metastatic Cancer of unknown primary :   check tumor markers  hold antiplatelet  must clarify goal of care with patient and family/ leaning towards noninvasive    #L popliteal DVT  therapeutic AC lovenox    2.Urinary colonization : ID d/hemal antibiotics.       3. HTN  clonidine 0.1 bid  4. Active DVT	  lovenox for life.   5. CVA  asa, plavix  lipitor 20  6. DVT ppx  lovenox  7. Anemia, chronic  trend .   High risk

## 2018-09-11 NOTE — PROGRESS NOTE ADULT - ASSESSMENT
IMPRESSION:  No evidence of infection.  No bacterial PNA    RECOMMENDATIONS:  Off antibiotics.  Needs a diagnostic biopsy

## 2018-09-11 NOTE — CONSULT NOTE ADULT - CONSULT REASON
uti
Pulmonary Nodules
Unknown malignancy
weakness/ old CVA / UTI
Patient has baseline dementia admitted for encephalopathy secondary to UTI. CXR on admission showed bilateral lung nodules (known-wife refused workup in the past - agreeable currently). CT scan shows bilateral lung nodules increased in size since last imaging, no evidence of neioplastic disease on CT abdomen and pelvis shows no evidence of intra-abdominal mets. CT head negative

## 2018-09-11 NOTE — PROGRESS NOTE ADULT - SUBJECTIVE AND OBJECTIVE BOX
TRAV GOVEA  77y, Male      OVERNIGHT EVENTS:    no fevers, clinically no change. No new complaints.    VITALS:  T(F): 97.8, Max: 97.8 (09-11-18 @ 05:10)  HR: 68  BP: 125/59  RR: 18Vital Signs Last 24 Hrs  T(C): 36.6 (11 Sep 2018 05:10), Max: 36.6 (11 Sep 2018 05:10)  T(F): 97.8 (11 Sep 2018 05:10), Max: 97.8 (11 Sep 2018 05:10)  HR: 68 (11 Sep 2018 05:10) (67 - 71)  BP: 125/59 (11 Sep 2018 05:10) (93/53 - 125/59)  BP(mean): --  RR: 18 (11 Sep 2018 05:10) (18 - 18)  SpO2: --    TESTS & MEASUREMENTS:              Culture - Blood (collected 09-07-18 @ 08:50)  Source: .Blood None  Preliminary Report (09-08-18 @ 16:02):    No growth to date.    Culture - Blood (collected 09-06-18 @ 12:20)  Source: .Blood None  Preliminary Report (09-08-18 @ 01:01):    No growth to date.            RADIOLOGY & ADDITIONAL TESTS:    ANTIBIOTICS:

## 2018-09-11 NOTE — CONSULT NOTE ADULT - SUBJECTIVE AND OBJECTIVE BOX
INTERVENTIONAL RADIOLOGY CONSULT:     Procedure Requested: Biopsy     HPI:  77 M w/ pmh of htn, dld, cva x4 w/ r hemiparesis, poor historian who presents to the ed c/o generalized weakness & loss of appetite. For the past 4 days the pt has not been getting out of bed & has decreased PO intake which per the wife is unusual for him and is not his baseline. Pt denies SOB, chest pain, abd pain, n/v/d, urinary complaints. No fever. (02 Sep 2018 21:31)      PAST MEDICAL & SURGICAL HISTORY:  Hypertension  CVA (cerebral vascular accident)      MEDICATIONS  (STANDING):  aspirin  chewable 81 milliGRAM(s) Oral daily  atorvastatin 20 milliGRAM(s) Oral at bedtime  cloNIDine 0.1 milliGRAM(s) Oral two times a day  clopidogrel Tablet 75 milliGRAM(s) Oral daily  enoxaparin Injectable 65 milliGRAM(s) SubCutaneous two times a day  gabapentin 100 milliGRAM(s) Oral two times a day  magnesium oxide 400 milliGRAM(s) Oral daily  pantoprazole    Tablet 40 milliGRAM(s) Oral before breakfast  sodium chloride 0.9%. 1000 milliLiter(s) (50 mL/Hr) IV Continuous <Continuous>  tamsulosin 0.4 milliGRAM(s) Oral at bedtime    MEDICATIONS  (PRN):  ALBUTerol/ipratropium for Nebulization 3 milliLiter(s) Nebulizer every 6 hours PRN Shortness of Breath and/or Wheezing      Allergies    penicillin (Unknown)    Intolerances        Social History:   Smoking: Yes [ ]  No [ ]   ______pk yrs  ETOH  Yes [ ]  No [ ]  Social [ ]  DRUGS:  Yes [ ]  No [ ]  if so what______________    FAMILY HISTORY:  No pertinent family history in first degree relatives      Physical Exam:   Vital Signs Last 24 Hrs  T(C): 36.2 (11 Sep 2018 13:29), Max: 36.6 (11 Sep 2018 05:10)  T(F): 97.2 (11 Sep 2018 13:29), Max: 97.8 (11 Sep 2018 05:10)  HR: 64 (11 Sep 2018 13:29) (64 - 71)  BP: 92/52 (11 Sep 2018 13:29) (92/52 - 125/59)  BP(mean): --  RR: 19 (11 Sep 2018 13:29) (18 - 19)  SpO2: --          Pertinent labs:            Radiology & Additional Studies:     Radiology imaging reviewed.       ASSESSMENT/ PLAN:     77 M w/ PMHx  of htn, dld, cva x4 w/ r hemiparesis, poor historian who presents to the ed c/o generalized weakness, loss of appetite being treated for UTI, known to have multiple enlarge lung nodules and masses with unknown primary. IR consulted for lung biopsy.     - CT images reviewed, patient has a superficial right suprapubic mass that is more amenable for biopsy than lung lesions with less risk  - Patient on plavix received today 9/11/18, will need to hold for 5 days before procedure, tentatively schedule for Monday 9/17  - NPO at midnight Sunday before procedure  - Follow PT/INR, CBC  - Will follow  - Consent will need to be obtained from HCP      Risks, benefits, and alternatives to treatment discussed. All questions answered with understanding.    Thank you for the courtesy of this consult, please call [phone] with any further questions. INTERVENTIONAL RADIOLOGY CONSULT:     Procedure Requested: Biopsy     HPI:  77 M w/ pmh of htn, dld, cva x4 w/ r hemiparesis, poor historian who presents to the ed c/o generalized weakness & loss of appetite. For the past 4 days the pt has not been getting out of bed & has decreased PO intake which per the wife is unusual for him and is not his baseline. Pt denies SOB, chest pain, abd pain, n/v/d, urinary complaints. No fever. (02 Sep 2018 21:31)      PAST MEDICAL & SURGICAL HISTORY:  Hypertension  CVA (cerebral vascular accident)      MEDICATIONS  (STANDING):  aspirin  chewable 81 milliGRAM(s) Oral daily  atorvastatin 20 milliGRAM(s) Oral at bedtime  cloNIDine 0.1 milliGRAM(s) Oral two times a day  clopidogrel Tablet 75 milliGRAM(s) Oral daily  enoxaparin Injectable 65 milliGRAM(s) SubCutaneous two times a day  gabapentin 100 milliGRAM(s) Oral two times a day  magnesium oxide 400 milliGRAM(s) Oral daily  pantoprazole    Tablet 40 milliGRAM(s) Oral before breakfast  sodium chloride 0.9%. 1000 milliLiter(s) (50 mL/Hr) IV Continuous <Continuous>  tamsulosin 0.4 milliGRAM(s) Oral at bedtime    MEDICATIONS  (PRN):  ALBUTerol/ipratropium for Nebulization 3 milliLiter(s) Nebulizer every 6 hours PRN Shortness of Breath and/or Wheezing      Allergies    penicillin (Unknown)    Intolerances        Social History:   Smoking: Yes [ ]  No [ ]   ______pk yrs  ETOH  Yes [ ]  No [ ]  Social [ ]  DRUGS:  Yes [ ]  No [ ]  if so what______________    FAMILY HISTORY:  No pertinent family history in first degree relatives      Physical Exam:   Vital Signs Last 24 Hrs  T(C): 36.2 (11 Sep 2018 13:29), Max: 36.6 (11 Sep 2018 05:10)  T(F): 97.2 (11 Sep 2018 13:29), Max: 97.8 (11 Sep 2018 05:10)  HR: 64 (11 Sep 2018 13:29) (64 - 71)  BP: 92/52 (11 Sep 2018 13:29) (92/52 - 125/59)  BP(mean): --  RR: 19 (11 Sep 2018 13:29) (18 - 19)  SpO2: --          Pertinent labs:            Radiology & Additional Studies:     Radiology imaging reviewed.       ASSESSMENT/ PLAN:     77 M w/ PMHx  of htn, dld, cva x4 w/ r hemiparesis, poor historian who presents to the ed c/o generalized weakness, loss of appetite being treated for UTI, known to have multiple enlarge lung nodules and masses with unknown primary. IR consulted for lung biopsy.     - CT images reviewed, patient has a superficial right suprapubic mass that is more amenable for biopsy than lung lesions with less risk  - Patient on plavix received today 9/11/18, will need to hold for 5 days before procedure, tentatively schedule for Monday 9/17  - NPO at midnight Sunday before procedure  - Follow PT/INR, CBC  - Will follow  - Consent will need to be obtained from HCP      Thank you for the courtesy of this consult, please call [phone] with any further questions.

## 2018-09-11 NOTE — PROGRESS NOTE ADULT - ASSESSMENT
TRAV GOVEA 77y Male  MRN#: [id number]   CODE STATUS:FULLCODE    SUBJECTIVE  Patient is a 77y old Male who presents with a chief complaint of metabolic encephalopathy, lung nodules (11 Sep 2018 17:06)  Currently admitted to medicine with the primary diagnosis of UTI (urinary tract infection)  Today is hospital day 9d, and this morning he is resting comfortably in bed and reports no overnight events.       OBJECTIVE  PAST MEDICAL & SURGICAL HISTORY  Hypertension  CVA (cerebral vascular accident)    ALLERGIES:  penicillin (Unknown)    MEDICATIONS:  STANDING MEDICATIONS  aspirin  chewable 81 milliGRAM(s) Oral daily  atorvastatin 20 milliGRAM(s) Oral at bedtime  cloNIDine 0.1 milliGRAM(s) Oral two times a day  clopidogrel Tablet 75 milliGRAM(s) Oral daily  enoxaparin Injectable 65 milliGRAM(s) SubCutaneous two times a day  gabapentin 100 milliGRAM(s) Oral two times a day  magnesium oxide 400 milliGRAM(s) Oral daily  pantoprazole    Tablet 40 milliGRAM(s) Oral before breakfast  sodium chloride 0.9%. 1000 milliLiter(s) IV Continuous <Continuous>  tamsulosin 0.4 milliGRAM(s) Oral at bedtime    PRN MEDICATIONS  ALBUTerol/ipratropium for Nebulization 3 milliLiter(s) Nebulizer every 6 hours PRN      VITAL SIGNS: Last 24 Hours  T(C): 36.2 (11 Sep 2018 13:29), Max: 36.6 (11 Sep 2018 05:10)  T(F): 97.2 (11 Sep 2018 13:29), Max: 97.8 (11 Sep 2018 05:10)  HR: 64 (11 Sep 2018 13:29) (64 - 71)  BP: 92/52 (11 Sep 2018 13:29) (92/52 - 125/59)  BP(mean): --  RR: 19 (11 Sep 2018 13:29) (18 - 19)  SpO2: --    LABS:    09-11    136  |  105  |  9<L>  ----------------------------<  104<H>  4.4   |  22  |  0.7    Ca    7.4<L>      11 Sep 2018 13:25      RADIOLOGY:      PHYSICAL EXAM:    GENERAL: No acute distress, afebrile, BP borderline at baseline. Alert. Oriented to people (knows his wife's name).   HEENT:  Sclera injected  PULMONARY: Lung fields clear bilaterally  CARDIOVASCULAR: Heart sounds regular   GASTROINTESTINAL: Soft, nondistended, bowel sounds audible   MUSCULOSKELETAL:  No lower extremity edema, positive peripheral pulses. Calves not warm / tender to touch   NEUROLOGY: Mild weakness right upper and lower extremities       ADMISSION SUMMARY  Patient is a 77y old Male who presents with a chief complaint of metabolic encephalopathy, lung nodules (11 Sep 2018 17:06)  Currently admitted to medicine with the primary diagnosis of UTI (urinary tract infection)      ASSESSMENT & PLAN    77 M w/ PMHx  of htn, dld, cva x4 w/ r hemiparesis, poor historian who presents to the ed c/o generalized weakness, loss of appetite and burning on urination, found to have a positive MARCIE in the urine. Blood culture positive for coryneform Gram (+) rods. Active workup for bilateral lung nodules     #  encephalopathy:     - Blood culture on 09/03 positive for coryneform Gram (+) rods. Repeat culture was negative   - TTE performed : could not be read as patient was reflecting all the ultrasounds per echo tech   - ID consult : Maintain antibiotics off       # Bilateral lung masses on chest x ray : Known     - CT chest w/IV contrast + CT abdomen and pelvis w/oral and IV contrast performed : The CT scan of the chest showing increase number and size of the pulmonary nodules since   August 7, 2017. No evidence of intra-abdominal metastases on CT scan abdomen and pelvis   - Patient has a history of right lower extremity sarcoma   MRI : PATIENT HAS DIFFUSE BODY METAL PIECES FROM [place]  - Oncology consult placed  - Pulmonary consult : consider palliative care,   - IR for bx, family refuses biopsy and any kind of invasive/aggressive care.  -PSA, CEA,  are negative    # HO HTN / DLD / CVA  - C/w home meds : Aspirin, Plavix, Atorvastatin, Clonidine     # DVT   - VA duplex of the lower extremities showed Mynor appearing DVT in the left popliteal vein. Right thigh mass measuring 2 x 2 centimeters second mass measuring 2 x 3 cm  - Patient on therapeutic Lovenox : 65 mg sc BID        # GI prophylaxis : Protonix 40 mg daily     # Diet : DASH diet     # Activity : Increase as tolerated     # Code status : FULL     # Disposition : home, lives with wife TRAV GOVEA 77y Male  MRN#: [id number]   CODE STATUS:FULLCODE    SUBJECTIVE  Patient is a 77y old Male who presents with a chief complaint of metabolic encephalopathy, lung nodules (11 Sep 2018 17:06)  Currently admitted to medicine with the primary diagnosis of UTI (urinary tract infection)  Today is hospital day 9d, and this morning he is resting comfortably in bed and reports no overnight events.       OBJECTIVE  PAST MEDICAL & SURGICAL HISTORY  Hypertension  CVA (cerebral vascular accident)    ALLERGIES:  penicillin (Unknown)    MEDICATIONS:  STANDING MEDICATIONS  aspirin  chewable 81 milliGRAM(s) Oral daily  atorvastatin 20 milliGRAM(s) Oral at bedtime  cloNIDine 0.1 milliGRAM(s) Oral two times a day  clopidogrel Tablet 75 milliGRAM(s) Oral daily  enoxaparin Injectable 65 milliGRAM(s) SubCutaneous two times a day  gabapentin 100 milliGRAM(s) Oral two times a day  magnesium oxide 400 milliGRAM(s) Oral daily  pantoprazole    Tablet 40 milliGRAM(s) Oral before breakfast  sodium chloride 0.9%. 1000 milliLiter(s) IV Continuous <Continuous>  tamsulosin 0.4 milliGRAM(s) Oral at bedtime    PRN MEDICATIONS  ALBUTerol/ipratropium for Nebulization 3 milliLiter(s) Nebulizer every 6 hours PRN      VITAL SIGNS: Last 24 Hours  T(C): 36.2 (11 Sep 2018 13:29), Max: 36.6 (11 Sep 2018 05:10)  T(F): 97.2 (11 Sep 2018 13:29), Max: 97.8 (11 Sep 2018 05:10)  HR: 64 (11 Sep 2018 13:29) (64 - 71)  BP: 92/52 (11 Sep 2018 13:29) (92/52 - 125/59)  BP(mean): --  RR: 19 (11 Sep 2018 13:29) (18 - 19)  SpO2: --    LABS:    09-11    136  |  105  |  9<L>  ----------------------------<  104<H>  4.4   |  22  |  0.7    Ca    7.4<L>      11 Sep 2018 13:25      RADIOLOGY:      PHYSICAL EXAM:    GENERAL: No acute distress, afebrile, BP borderline at baseline. Alert. Oriented to people (knows his wife's name).   HEENT:  Sclera injected  PULMONARY: Lung fields clear bilaterally  CARDIOVASCULAR: Heart sounds regular   GASTROINTESTINAL: Soft, nondistended, bowel sounds audible   MUSCULOSKELETAL:  No lower extremity edema, positive peripheral pulses. Calves not warm / tender to touch   NEUROLOGY: Mild weakness right upper and lower extremities       ADMISSION SUMMARY  Patient is a 77y old Male who presents with a chief complaint of metabolic encephalopathy, lung nodules (11 Sep 2018 17:06)  Currently admitted to medicine with the primary diagnosis of UTI (urinary tract infection)      ASSESSMENT & PLAN    77 M w/ PMHx  of htn, dld, cva x4 w/ r hemiparesis, poor historian who presents to the ed c/o generalized weakness, loss of appetite and burning on urination, found to have a positive MARCIE in the urine. Blood culture positive for coryneform Gram (+) rods. Active workup for bilateral lung nodules     #  encephalopathy:     - Blood culture on 09/03 positive for coryneform Gram (+) rods. Repeat culture was negative   - TTE performed : could not be read as patient was reflecting all the ultrasounds per echo tech   - ID consult : Maintain antibiotics off       # Bilateral lung nodules    - CT chest w/IV contrast + CT abdomen and pelvis w/oral and IV contrast performed : The CT scan of the chest showing increase number and size of the pulmonary nodules since   August 7, 2017. No evidence of intra-abdominal metastases on CT scan abdomen and pelvis   - Patient has a history of right lower extremity sarcoma   MRI : PATIENT HAS DIFFUSE BODY METAL PIECES FROM [place]  - Oncology consult placed  - Pulmonary consult : consider palliative care,   - IR for bx, family refuses biopsy and any kind of invasive/aggressive care.  -PSA, CEA,  are negative    # HO HTN / DLD / CVA  - C/w home meds : Aspirin, Plavix, Atorvastatin, Clonidine     # DVT   - VA duplex of the lower extremities showed Mynor appearing DVT in the left popliteal vein. Right thigh mass measuring 2 x 2 centimeters second mass measuring 2 x 3 cm  - Patient on therapeutic Lovenox : 65 mg sc BID        # GI prophylaxis : Protonix 40 mg daily     # Diet : DASH diet     # Activity : Increase as tolerated     # Code status : FULL     # Disposition : home, lives with wife

## 2018-09-12 LAB
CULTURE RESULTS: SIGNIFICANT CHANGE UP
CULTURE RESULTS: SIGNIFICANT CHANGE UP
GLUCOSE BLDC GLUCOMTR-MCNC: 104 MG/DL — HIGH (ref 70–99)
GLUCOSE BLDC GLUCOMTR-MCNC: 119 MG/DL — HIGH (ref 70–99)
GLUCOSE BLDC GLUCOMTR-MCNC: 122 MG/DL — HIGH (ref 70–99)
SPECIMEN SOURCE: SIGNIFICANT CHANGE UP
SPECIMEN SOURCE: SIGNIFICANT CHANGE UP

## 2018-09-12 RX ADMIN — ATORVASTATIN CALCIUM 20 MILLIGRAM(S): 80 TABLET, FILM COATED ORAL at 22:04

## 2018-09-12 RX ADMIN — PANTOPRAZOLE SODIUM 40 MILLIGRAM(S): 20 TABLET, DELAYED RELEASE ORAL at 06:23

## 2018-09-12 RX ADMIN — GABAPENTIN 100 MILLIGRAM(S): 400 CAPSULE ORAL at 17:10

## 2018-09-12 RX ADMIN — ENOXAPARIN SODIUM 65 MILLIGRAM(S): 100 INJECTION SUBCUTANEOUS at 17:10

## 2018-09-12 RX ADMIN — ENOXAPARIN SODIUM 65 MILLIGRAM(S): 100 INJECTION SUBCUTANEOUS at 06:23

## 2018-09-12 RX ADMIN — Medication 81 MILLIGRAM(S): at 11:13

## 2018-09-12 RX ADMIN — TAMSULOSIN HYDROCHLORIDE 0.4 MILLIGRAM(S): 0.4 CAPSULE ORAL at 22:04

## 2018-09-12 RX ADMIN — SODIUM CHLORIDE 50 MILLILITER(S): 9 INJECTION INTRAMUSCULAR; INTRAVENOUS; SUBCUTANEOUS at 06:27

## 2018-09-12 RX ADMIN — Medication 0.1 MILLIGRAM(S): at 06:22

## 2018-09-12 RX ADMIN — GABAPENTIN 100 MILLIGRAM(S): 400 CAPSULE ORAL at 06:23

## 2018-09-12 RX ADMIN — MAGNESIUM OXIDE 400 MG ORAL TABLET 400 MILLIGRAM(S): 241.3 TABLET ORAL at 11:13

## 2018-09-12 RX ADMIN — CLOPIDOGREL BISULFATE 75 MILLIGRAM(S): 75 TABLET, FILM COATED ORAL at 11:13

## 2018-09-12 NOTE — PROGRESS NOTE ADULT - SUBJECTIVE AND OBJECTIVE BOX
TRAV GOVEA  77y, Male      OVERNIGHT EVENTS:    no fevers, no cough/ SOB/ abdominal pain.    VITALS:  T(F): 97.2, Max: 98.5 (09-11-18 @ 20:42)  HR: 53  BP: 102/57  RR: 18Vital Signs Last 24 Hrs  T(C): 36.2 (12 Sep 2018 05:10), Max: 36.9 (11 Sep 2018 20:42)  T(F): 97.2 (12 Sep 2018 05:10), Max: 98.5 (11 Sep 2018 20:42)  HR: 53 (12 Sep 2018 05:10) (53 - 64)  BP: 102/57 (12 Sep 2018 05:10) (92/52 - 117/58)  BP(mean): --  RR: 18 (12 Sep 2018 05:10) (18 - 19)  SpO2: --    TESTS & MEASUREMENTS:    09-11    136  |  105  |  9<L>  ----------------------------<  104<H>  4.4   |  22  |  0.7    Ca    7.4<L>      11 Sep 2018 13:25          Culture - Blood (collected 09-07-18 @ 08:50)  Source: .Blood None  Preliminary Report (09-08-18 @ 16:02):    No growth to date.    Culture - Blood (collected 09-06-18 @ 12:20)  Source: .Blood None  Final Report (09-12-18 @ 01:00):    No growth at 5 days.            RADIOLOGY & ADDITIONAL TESTS:    ANTIBIOTICS:

## 2018-09-12 NOTE — PROGRESS NOTE ADULT - ASSESSMENT
TRAV GOVEA 77y Male  MRN#: [id number]   CODE STATUS:FULLCODE      SUBJECTIVE  Patient is a 77y old Male who presents with a chief complaint of UTI (11 Sep 2018 17:34)  Currently admitted to medicine with the primary diagnosis of UTI (urinary tract infection)  Today is hospital day 10d, and this morning he is _________ and reports ________ overnight events.     OBJECTIVE  PAST MEDICAL & SURGICAL HISTORY  Hypertension  CVA (cerebral vascular accident)    ALLERGIES:  penicillin (Unknown)    MEDICATIONS:  STANDING MEDICATIONS  aspirin  chewable 81 milliGRAM(s) Oral daily  atorvastatin 20 milliGRAM(s) Oral at bedtime  cloNIDine 0.1 milliGRAM(s) Oral two times a day  clopidogrel Tablet 75 milliGRAM(s) Oral daily  enoxaparin Injectable 65 milliGRAM(s) SubCutaneous two times a day  gabapentin 100 milliGRAM(s) Oral two times a day  magnesium oxide 400 milliGRAM(s) Oral daily  pantoprazole    Tablet 40 milliGRAM(s) Oral before breakfast  sodium chloride 0.9%. 1000 milliLiter(s) IV Continuous <Continuous>  tamsulosin 0.4 milliGRAM(s) Oral at bedtime    PRN MEDICATIONS  ALBUTerol/ipratropium for Nebulization 3 milliLiter(s) Nebulizer every 6 hours PRN      VITAL SIGNS: Last 24 Hours  T(C): 36.2 (12 Sep 2018 05:10), Max: 36.9 (11 Sep 2018 20:42)  T(F): 97.2 (12 Sep 2018 05:10), Max: 98.5 (11 Sep 2018 20:42)  HR: 53 (12 Sep 2018 05:10) (53 - 64)  BP: 102/57 (12 Sep 2018 05:10) (92/52 - 117/58)  BP(mean): --  RR: 18 (12 Sep 2018 05:10) (18 - 19)  SpO2: --    LABS:    09-11    136  |  105  |  9<L>  ----------------------------<  104<H>  4.4   |  22  |  0.7    Ca    7.4<L>      11 Sep 2018 13:25                    RADIOLOGY:      PHYSICAL EXAM:    GENERAL: No acute distress, afebrile, BP borderline at baseline. Alert. Oriented to people (knows his wife's name).   HEENT:  Sclera injected  PULMONARY: Lung fields clear bilaterally  CARDIOVASCULAR: Heart sounds regular   GASTROINTESTINAL: Soft, nondistended, bowel sounds audible   MUSCULOSKELETAL:  No lower extremity edema, positive peripheral pulses. Calves not warm / tender to touch   NEUROLOGY: Mild weakness right upper and lower extremities       ADMISSION SUMMARY  Patient is a 77y old Male who presents with a chief complaint of UTI (11 Sep 2018 17:34)  Currently admitted to medicine with the primary diagnosis of UTI (urinary tract infection)        ASSESSMENT & PLAN    77 M w/ PMHx  of htn, dld, cva x4 w/ r hemiparesis, poor historian who presents to the ed c/o generalized weakness, loss of appetite and burning on urination, found to have a positive MARCIE in the urine. Blood culture positive for coryneform Gram (+) rods. Active workup for bilateral lung nodules     #  encephalopathy:     - Blood culture on 09/03 positive for coryneform Gram (+) rods. Repeat culture was negative   - TTE performed : could not be read as patient was reflecting all the ultrasounds per echo tech   - ID consult : Maintain antibiotics off       # Bilateral lung nodules    - CT chest w/IV contrast + CT abdomen and pelvis w/oral and IV contrast performed : The CT scan of the chest showing increase number and size of the pulmonary nodules since   August 7, 2017. No evidence of intra-abdominal metastases on CT scan abdomen and pelvis   - Patient has a history of right lower extremity sarcoma   MRI : PATIENT HAS DIFFUSE BODY METAL PIECES FROM [place]  - Oncology consult placed  - Pulmonary consult : consider palliative care,   - IR for bx, family refuses biopsy and any kind of invasive/aggressive care.  -PSA, CEA,  are negative    # HO HTN / DLD / CVA  - C/w home meds : Aspirin, Plavix, Atorvastatin, Clonidine     # DVT   - VA duplex of the lower extremities showed Mynor appearing DVT in the left popliteal vein. Right thigh mass measuring 2 x 2 centimeters second mass measuring 2 x 3 cm  - Patient on therapeutic Lovenox : 65 mg sc BID        # GI prophylaxis : Protonix 40 mg daily     # Diet : DASH diet     # Activity : Increase as tolerated     # Code status : FULL     # Disposition : home, lives with wife TRAV GOVEA 77y Male  MRN#: [id number]   CODE STATUS:FULLCODE      SUBJECTIVE  Patient is a 77y old Male who presents with a chief complaint of UTI (11 Sep 2018 17:34)  Currently admitted to medicine with the primary diagnosis of UTI (urinary tract infection)  Today is hospital day 10d, and this morning he is resting comfortably in bed and reports no overnight events.     OBJECTIVE  PAST MEDICAL & SURGICAL HISTORY  Hypertension  CVA (cerebral vascular accident)    ALLERGIES:  penicillin (Unknown)    MEDICATIONS:  STANDING MEDICATIONS  aspirin  chewable 81 milliGRAM(s) Oral daily  atorvastatin 20 milliGRAM(s) Oral at bedtime  cloNIDine 0.1 milliGRAM(s) Oral two times a day  clopidogrel Tablet 75 milliGRAM(s) Oral daily  enoxaparin Injectable 65 milliGRAM(s) SubCutaneous two times a day  gabapentin 100 milliGRAM(s) Oral two times a day  magnesium oxide 400 milliGRAM(s) Oral daily  pantoprazole    Tablet 40 milliGRAM(s) Oral before breakfast  sodium chloride 0.9%. 1000 milliLiter(s) IV Continuous <Continuous>  tamsulosin 0.4 milliGRAM(s) Oral at bedtime    PRN MEDICATIONS  ALBUTerol/ipratropium for Nebulization 3 milliLiter(s) Nebulizer every 6 hours PRN      VITAL SIGNS: Last 24 Hours  T(C): 36.2 (12 Sep 2018 05:10), Max: 36.9 (11 Sep 2018 20:42)  T(F): 97.2 (12 Sep 2018 05:10), Max: 98.5 (11 Sep 2018 20:42)  HR: 53 (12 Sep 2018 05:10) (53 - 64)  BP: 102/57 (12 Sep 2018 05:10) (92/52 - 117/58)  BP(mean): --  RR: 18 (12 Sep 2018 05:10) (18 - 19)  SpO2: --    LABS:    09-11    136  |  105  |  9<L>  ----------------------------<  104<H>  4.4   |  22  |  0.7    Ca    7.4<L>      11 Sep 2018 13:25                    RADIOLOGY:      PHYSICAL EXAM:    GENERAL: No acute distress, afebrile, BP borderline at baseline. Alert. Oriented to people (knows his wife's name).   HEENT:  Sclera injected  PULMONARY: Lung fields clear bilaterally  CARDIOVASCULAR: Heart sounds regular   GASTROINTESTINAL: Soft, nondistended, bowel sounds audible   MUSCULOSKELETAL:  No lower extremity edema, positive peripheral pulses. Calves not warm / tender to touch   NEUROLOGY: Mild weakness right upper and lower extremities       ADMISSION SUMMARY  Patient is a 77y old Male who presents with a chief complaint of UTI (11 Sep 2018 17:34)  Currently admitted to medicine with the primary diagnosis of UTI (urinary tract infection)        ASSESSMENT & PLAN    77 M w/ PMHx  of htn, dld, cva x4 w/ r hemiparesis, poor historian who presents to the ed c/o generalized weakness, loss of appetite and burning on urination, found to have a positive MARCIE in the urine. Blood culture positive for coryneform Gram (+) rods. Active workup for bilateral lung nodules     #  encephalopathy:     - Blood  culture was negative   - TTE performed : could not be read as patient was reflecting all the ultrasounds per echo tech   - ID consult : Maintain antibiotics off       # Bilateral lung nodules    - CT chest w/IV contrast + CT abdomen and pelvis w/oral and IV contrast performed : The CT scan of the chest showing increase number and size of the pulmonary nodules since   August 7, 2017. No evidence of intra-abdominal metastases on CT scan abdomen and pelvis   - Patient has a history of right lower extremity sarcoma   MRI : PATIENT HAS DIFFUSE BODY METAL PIECES FROM [place]  - Oncology consulted; recommended biopsy   - Pulmonary consulted; recommends palliative care,   - IR for bx, family refuses biopsy and any kind of invasive/aggressive care.  -PSA, CEA,  are negative  - hospice consult    # HO HTN / DLD / CVA  - C/w home meds : Aspirin, Plavix, Atorvastatin, Clonidine     # DVT   - VA duplex of the lower extremities showed Mynor appearing DVT in the left popliteal vein. Right thigh mass measuring 2 x 2 centimeters second mass measuring 2 x 3 cm  - Patient on therapeutic Lovenox : 65 mg sc BID        # GI prophylaxis : Protonix 40 mg daily     # Diet : DASH diet     # Activity : Increase as tolerated     # Code status : FULL     # Disposition : home, lives with wife

## 2018-09-12 NOTE — PROGRESS NOTE ADULT - ASSESSMENT
IMPRESSION:  No evidence of infection.  No bacterial PNA    RECOMMENDATIONS:  Off antibiotics.  Recall prn please.

## 2018-09-13 RX ADMIN — Medication 0.1 MILLIGRAM(S): at 06:28

## 2018-09-13 RX ADMIN — MAGNESIUM OXIDE 400 MG ORAL TABLET 400 MILLIGRAM(S): 241.3 TABLET ORAL at 11:15

## 2018-09-13 RX ADMIN — ENOXAPARIN SODIUM 65 MILLIGRAM(S): 100 INJECTION SUBCUTANEOUS at 17:35

## 2018-09-13 RX ADMIN — Medication 0.1 MILLIGRAM(S): at 17:34

## 2018-09-13 RX ADMIN — GABAPENTIN 100 MILLIGRAM(S): 400 CAPSULE ORAL at 17:34

## 2018-09-13 RX ADMIN — GABAPENTIN 100 MILLIGRAM(S): 400 CAPSULE ORAL at 06:28

## 2018-09-13 RX ADMIN — TAMSULOSIN HYDROCHLORIDE 0.4 MILLIGRAM(S): 0.4 CAPSULE ORAL at 21:36

## 2018-09-13 RX ADMIN — PANTOPRAZOLE SODIUM 40 MILLIGRAM(S): 20 TABLET, DELAYED RELEASE ORAL at 06:28

## 2018-09-13 RX ADMIN — Medication 81 MILLIGRAM(S): at 11:15

## 2018-09-13 RX ADMIN — CLOPIDOGREL BISULFATE 75 MILLIGRAM(S): 75 TABLET, FILM COATED ORAL at 11:15

## 2018-09-13 RX ADMIN — ENOXAPARIN SODIUM 65 MILLIGRAM(S): 100 INJECTION SUBCUTANEOUS at 06:29

## 2018-09-13 RX ADMIN — ATORVASTATIN CALCIUM 20 MILLIGRAM(S): 80 TABLET, FILM COATED ORAL at 21:36

## 2018-09-13 NOTE — PROGRESS NOTE ADULT - ASSESSMENT
77M PMHx HTN, CVA with hemiparesis here with c/o generalized weakness, loss of appetite and burning on urination, found to have a positive MARCIE in the urine. Blood culture positive for coryneform Gram (+) rods. Active workup for bilateral lung nodules     1. Metastatic Cancer of unknown primary :   Per Onc, Pulm we were planning on biopsying the suprapubic mass on upcoming monday. But family chose no interventions.   Comfort measures only.   Hospice in process of arranging home O2 and other DME - and then D/c to home on hospice.    2.Urinary colonization : ID d/hemal antibiotics.       3. HTN  clonidine 0.1 bid  4. Active DVT	  lovenox for life.   5. CVA  asa, plavix  lipitor 20  6. DVT ppx  lovenox  7. Anemia, chronic  trend .

## 2018-09-13 NOTE — PROGRESS NOTE ADULT - ASSESSMENT
TRAV GOVEA 77y Male  MRN#: [id number]   CODE STATUS    SUBJECTIVE  Patient is a 77y old Male who presents with a chief complaint of UTI (13 Sep 2018 17:06)  Currently admitted to medicine with the primary diagnosis of UTI (urinary tract infection)  Today is hospital day 11d, and this morning he is resting comfortably in bed and reports no overnight events.     OBJECTIVE  PAST MEDICAL & SURGICAL HISTORY  Hypertension  CVA (cerebral vascular accident)    ALLERGIES:  penicillin (Unknown)    MEDICATIONS:  STANDING MEDICATIONS  aspirin  chewable 81 milliGRAM(s) Oral daily  atorvastatin 20 milliGRAM(s) Oral at bedtime  cloNIDine 0.1 milliGRAM(s) Oral two times a day  clopidogrel Tablet 75 milliGRAM(s) Oral daily  enoxaparin Injectable 65 milliGRAM(s) SubCutaneous two times a day  gabapentin 100 milliGRAM(s) Oral two times a day  magnesium oxide 400 milliGRAM(s) Oral daily  pantoprazole    Tablet 40 milliGRAM(s) Oral before breakfast  tamsulosin 0.4 milliGRAM(s) Oral at bedtime    PRN MEDICATIONS  ALBUTerol/ipratropium for Nebulization 3 milliLiter(s) Nebulizer every 6 hours PRN      VITAL SIGNS: Last 24 Hours  T(C): 36.2 (13 Sep 2018 14:43), Max: 36.8 (12 Sep 2018 20:22)  T(F): 97.2 (13 Sep 2018 14:43), Max: 98.3 (12 Sep 2018 20:22)  HR: 73 (13 Sep 2018 14:43) (59 - 73)  BP: 125/58 (13 Sep 2018 14:43) (125/58 - 160/69)  BP(mean): --  RR: 18 (13 Sep 2018 14:43) (18 - 18)  SpO2: --    LABS:                        RADIOLOGY:      PHYSICAL EXAM:    GENERAL: No acute distress, afebrile, BP borderline at baseline. Alert. Oriented to people (knows his wife's name).   HEENT:  Sclera injected  PULMONARY: Lung fields clear bilaterally  CARDIOVASCULAR: Heart sounds regular   GASTROINTESTINAL: Soft, nondistended, bowel sounds audible   MUSCULOSKELETAL:  No lower extremity edema, positive peripheral pulses. Calves not warm / tender to touch   NEUROLOGY: Mild weakness right upper and lower extremities       ADMISSION SUMMARY  Patient is a 77y old Male who presents with a chief complaint of UTI (13 Sep 2018 17:06)  Currently admitted to medicine with the primary diagnosis of UTI (urinary tract infection)        ASSESSMENT & PLAN      77 M w/ PMHx  of htn, dld, cva x4 w/ r hemiparesis, poor historian who presents to the ed c/o generalized weakness, loss of appetite and burning on urination, found to have a positive MARCIE in the urine. Blood culture positive for coryneform Gram (+) rods. Active workup for bilateral lung nodules     #  encephalopathy:     - Blood  culture was negative   - TTE performed : could not be read as patient was reflecting all the ultrasounds per echo tech   - ID consult : Maintain antibiotics off       # Bilateral lung nodules    - CT chest w/IV contrast + CT abdomen and pelvis w/oral and IV contrast performed : The CT scan of the chest showing increase number and size of the pulmonary nodules since   August 7, 2017. No evidence of intra-abdominal metastases on CT scan abdomen and pelvis   - Patient has a history of right lower extremity sarcoma   MRI : PATIENT HAS DIFFUSE BODY METAL PIECES FROM [place]  - Oncology consulted; recommended biopsy   - Pulmonary consulted; recommends palliative care,   - IR for bx, family refuses biopsy and any kind of invasive/aggressive care.  -PSA, CEA,  are negative  - hospice     # HO HTN / DLD / CVA  - C/w home meds : Aspirin, Plavix, Atorvastatin, Clonidine     # DVT   - VA duplex of the lower extremities showed Mynor appearing DVT in the left popliteal vein. Right thigh mass measuring 2 x 2 centimeters second mass measuring 2 x 3 cm  - Patient on therapeutic Lovenox : 65 mg sc BID        # GI prophylaxis : Protonix 40 mg daily     # Diet : DASH diet     # Activity : Increase as tolerated     # Code status : FULL     # Disposition : home, lives with wife

## 2018-09-13 NOTE — PROGRESS NOTE ADULT - SUBJECTIVE AND OBJECTIVE BOX
S : comfortable  Bed bound  No new complaints/events      All other pertinent ROS negative.      09-12-18 @ 07:01  -  09-13-18 @ 07:00  --------------------------------------------------------  IN: 200 mL / OUT: 0 mL / NET: 200 mL      Vital Signs Last 24 Hrs  T(C): 36.2 (13 Sep 2018 14:43), Max: 36.8 (12 Sep 2018 20:22)  T(F): 97.2 (13 Sep 2018 14:43), Max: 98.3 (12 Sep 2018 20:22)  HR: 73 (13 Sep 2018 14:43) (59 - 73)  BP: 125/58 (13 Sep 2018 14:43) (125/58 - 160/69)  BP(mean): --  RR: 18 (13 Sep 2018 14:43) (18 - 18)  SpO2: --  PHYSICAL EXAM:    Constitutional: NAD, awake and alert, well-developed  HEENT: PERR, EOMI, Normal Hearing, MMM  Neck: Soft and supple, No LAD, No JVD  Respiratory: Breath sounds are clear bilaterally, No wheezing, rales or rhonchi  Cardiovascular: S1 and S2, regular rate and rhythm, no Murmurs, gallops or rubs  Gastrointestinal: Bowel Sounds present, soft, nontender, nondistended, no guarding, no rebound  Extremities: No peripheral edema    MEDICATIONS:  MEDICATIONS  (STANDING):  aspirin  chewable 81 milliGRAM(s) Oral daily  atorvastatin 20 milliGRAM(s) Oral at bedtime  cloNIDine 0.1 milliGRAM(s) Oral two times a day  clopidogrel Tablet 75 milliGRAM(s) Oral daily  enoxaparin Injectable 65 milliGRAM(s) SubCutaneous two times a day  gabapentin 100 milliGRAM(s) Oral two times a day  magnesium oxide 400 milliGRAM(s) Oral daily  pantoprazole    Tablet 40 milliGRAM(s) Oral before breakfast  tamsulosin 0.4 milliGRAM(s) Oral at bedtime      LABS: All Labs Reviewed:                Blood Culture:     Radiology: reviewed

## 2018-09-14 RX ADMIN — Medication 0.1 MILLIGRAM(S): at 05:18

## 2018-09-14 RX ADMIN — CLOPIDOGREL BISULFATE 75 MILLIGRAM(S): 75 TABLET, FILM COATED ORAL at 11:18

## 2018-09-14 RX ADMIN — Medication 81 MILLIGRAM(S): at 11:19

## 2018-09-14 RX ADMIN — ENOXAPARIN SODIUM 65 MILLIGRAM(S): 100 INJECTION SUBCUTANEOUS at 17:01

## 2018-09-14 RX ADMIN — ENOXAPARIN SODIUM 65 MILLIGRAM(S): 100 INJECTION SUBCUTANEOUS at 05:19

## 2018-09-14 RX ADMIN — GABAPENTIN 100 MILLIGRAM(S): 400 CAPSULE ORAL at 05:18

## 2018-09-14 RX ADMIN — PANTOPRAZOLE SODIUM 40 MILLIGRAM(S): 20 TABLET, DELAYED RELEASE ORAL at 06:33

## 2018-09-14 RX ADMIN — GABAPENTIN 100 MILLIGRAM(S): 400 CAPSULE ORAL at 17:01

## 2018-09-14 RX ADMIN — MAGNESIUM OXIDE 400 MG ORAL TABLET 400 MILLIGRAM(S): 241.3 TABLET ORAL at 11:19

## 2018-09-14 RX ADMIN — TAMSULOSIN HYDROCHLORIDE 0.4 MILLIGRAM(S): 0.4 CAPSULE ORAL at 21:42

## 2018-09-14 RX ADMIN — ATORVASTATIN CALCIUM 20 MILLIGRAM(S): 80 TABLET, FILM COATED ORAL at 21:42

## 2018-09-14 NOTE — PROGRESS NOTE ADULT - ASSESSMENT
TRAV GOVEA 77y Male  MRN#: [id number]   CODE STATUS:FULLCODE      SUBJECTIVE  Patient is a 77y old Male who presents with a chief complaint of UTI (13 Sep 2018 17:35)  Currently admitted to medicine with the primary diagnosis of UTI (urinary tract infection)  Today is hospital day 12d, and this morning he is resting comfortably in bed and reports no overnight events.     OBJECTIVE  PAST MEDICAL & SURGICAL HISTORY  Hypertension  CVA (cerebral vascular accident)    ALLERGIES:  penicillin (Unknown)    MEDICATIONS:  STANDING MEDICATIONS  aspirin  chewable 81 milliGRAM(s) Oral daily  atorvastatin 20 milliGRAM(s) Oral at bedtime  cloNIDine 0.1 milliGRAM(s) Oral two times a day  clopidogrel Tablet 75 milliGRAM(s) Oral daily  enoxaparin Injectable 65 milliGRAM(s) SubCutaneous two times a day  gabapentin 100 milliGRAM(s) Oral two times a day  magnesium oxide 400 milliGRAM(s) Oral daily  pantoprazole    Tablet 40 milliGRAM(s) Oral before breakfast  tamsulosin 0.4 milliGRAM(s) Oral at bedtime    PRN MEDICATIONS  ALBUTerol/ipratropium for Nebulization 3 milliLiter(s) Nebulizer every 6 hours PRN      VITAL SIGNS: Last 24 Hours  T(C): 36.4 (14 Sep 2018 04:49), Max: 36.6 (13 Sep 2018 20:45)  T(F): 97.5 (14 Sep 2018 04:49), Max: 97.8 (13 Sep 2018 20:45)  HR: 65 (14 Sep 2018 04:49) (65 - 77)  BP: 109/65 (14 Sep 2018 04:49) (109/65 - 138/65)  BP(mean): --  RR: 18 (14 Sep 2018 04:49) (18 - 18)  SpO2: --    LABS:                    RADIOLOGY:      PHYSICAL EXAM:    GENERAL: No acute distress, afebrile, BP borderline at baseline. Alert. Oriented to people (knows his wife's name).   HEENT:  Sclera injected  PULMONARY: Lung fields clear bilaterally  CARDIOVASCULAR: Heart sounds regular   GASTROINTESTINAL: Soft, nondistended, bowel sounds audible   MUSCULOSKELETAL:  No lower extremity edema, positive peripheral pulses. Calves not warm / tender to touch   NEUROLOGY: Mild weakness right upper and lower extremities         ADMISSION SUMMARY  Patient is a 77y old Male who presents with a chief complaint of UTI (13 Sep 2018 17:35)  Currently admitted to medicine with the primary diagnosis of UTI (urinary tract infection)        ASSESSMENT & PLAN      77 M w/ PMHx  of htn, dld, cva x4 w/ r hemiparesis, poor historian who presents to the ed c/o generalized weakness, loss of appetite and burning on urination, found to have a positive MARCIE in the urine. Blood culture positive for coryneform Gram (+) rods. Active workup for bilateral lung nodules     #  encephalopathy:     - Blood  culture was negative   - TTE performed : could not be read as patient was reflecting all the ultrasounds per echo tech   - ID consult : Maintain antibiotics off       # Bilateral lung nodules    - CT chest w/IV contrast + CT abdomen and pelvis w/oral and IV contrast performed : The CT scan of the chest showing increase number and size of the pulmonary nodules since   August 7, 2017. No evidence of intra-abdominal metastases on CT scan abdomen and pelvis   - Patient has a history of right lower extremity sarcoma   MRI : PATIENT HAS DIFFUSE BODY METAL PIECES FROM [place]  - Oncology consulted; recommended biopsy   - Pulmonary consulted; recommends palliative care,   - IR for bx, family refuses biopsy and any kind of invasive/aggressive care.  -PSA, CEA,  are negative  - hospice     # HO HTN / DLD / CVA  - C/w home meds : Aspirin, Plavix, Atorvastatin, Clonidine     # DVT   - VA duplex of the lower extremities showed Mynor appearing DVT in the left popliteal vein. Right thigh mass measuring 2 x 2 centimeters second mass measuring 2 x 3 cm  - Patient on therapeutic Lovenox : 65 mg sc BID        # GI prophylaxis : Protonix 40 mg daily     # Diet : DASH diet     # Activity : Increase as tolerated     # Code status : FULL     # Disposition : hospice care

## 2018-09-15 LAB
HCT VFR BLD CALC: 30.8 % — LOW (ref 42–52)
HGB BLD-MCNC: 10.4 G/DL — LOW (ref 14–18)
MCHC RBC-ENTMCNC: 31.5 PG — HIGH (ref 27–31)
MCHC RBC-ENTMCNC: 33.8 G/DL — SIGNIFICANT CHANGE UP (ref 32–37)
MCV RBC AUTO: 93.3 FL — SIGNIFICANT CHANGE UP (ref 80–94)
NRBC # BLD: 0 /100 WBCS — SIGNIFICANT CHANGE UP (ref 0–0)
PLATELET # BLD AUTO: 284 K/UL — SIGNIFICANT CHANGE UP (ref 130–400)
RBC # BLD: 3.3 M/UL — LOW (ref 4.7–6.1)
RBC # FLD: 13.5 % — SIGNIFICANT CHANGE UP (ref 11.5–14.5)
WBC # BLD: 5.78 K/UL — SIGNIFICANT CHANGE UP (ref 4.8–10.8)
WBC # FLD AUTO: 5.78 K/UL — SIGNIFICANT CHANGE UP (ref 4.8–10.8)

## 2018-09-15 RX ADMIN — GABAPENTIN 100 MILLIGRAM(S): 400 CAPSULE ORAL at 17:11

## 2018-09-15 RX ADMIN — Medication 0.1 MILLIGRAM(S): at 06:24

## 2018-09-15 RX ADMIN — ATORVASTATIN CALCIUM 20 MILLIGRAM(S): 80 TABLET, FILM COATED ORAL at 21:40

## 2018-09-15 RX ADMIN — ENOXAPARIN SODIUM 65 MILLIGRAM(S): 100 INJECTION SUBCUTANEOUS at 17:11

## 2018-09-15 RX ADMIN — Medication 0.1 MILLIGRAM(S): at 17:11

## 2018-09-15 RX ADMIN — TAMSULOSIN HYDROCHLORIDE 0.4 MILLIGRAM(S): 0.4 CAPSULE ORAL at 21:40

## 2018-09-15 RX ADMIN — Medication 81 MILLIGRAM(S): at 11:07

## 2018-09-15 RX ADMIN — ENOXAPARIN SODIUM 65 MILLIGRAM(S): 100 INJECTION SUBCUTANEOUS at 06:24

## 2018-09-15 RX ADMIN — MAGNESIUM OXIDE 400 MG ORAL TABLET 400 MILLIGRAM(S): 241.3 TABLET ORAL at 11:08

## 2018-09-15 RX ADMIN — GABAPENTIN 100 MILLIGRAM(S): 400 CAPSULE ORAL at 06:24

## 2018-09-15 RX ADMIN — CLOPIDOGREL BISULFATE 75 MILLIGRAM(S): 75 TABLET, FILM COATED ORAL at 11:08

## 2018-09-15 RX ADMIN — PANTOPRAZOLE SODIUM 40 MILLIGRAM(S): 20 TABLET, DELAYED RELEASE ORAL at 06:24

## 2018-09-15 NOTE — PROGRESS NOTE ADULT - ASSESSMENT
TRAV GOVEA 77y Male  MRN#: [id number]   CODE STATUS:FULLCODE    SUBJECTIVE  Patient is a 77y old Male who presents with a chief complaint of UTI (14 Sep 2018 08:02)  Currently admitted to medicine with the primary diagnosis of UTI (urinary tract infection)  Today is hospital day 13d, and this morning he is resting comfortably in bed and reports no overnight events.       OBJECTIVE  PAST MEDICAL & SURGICAL HISTORY  Hypertension  CVA (cerebral vascular accident)    ALLERGIES:  penicillin (Unknown)    MEDICATIONS:  STANDING MEDICATIONS  aspirin  chewable 81 milliGRAM(s) Oral daily  atorvastatin 20 milliGRAM(s) Oral at bedtime  cloNIDine 0.1 milliGRAM(s) Oral two times a day  clopidogrel Tablet 75 milliGRAM(s) Oral daily  enoxaparin Injectable 65 milliGRAM(s) SubCutaneous two times a day  gabapentin 100 milliGRAM(s) Oral two times a day  magnesium oxide 400 milliGRAM(s) Oral daily  pantoprazole    Tablet 40 milliGRAM(s) Oral before breakfast  tamsulosin 0.4 milliGRAM(s) Oral at bedtime    PRN MEDICATIONS  ALBUTerol/ipratropium for Nebulization 3 milliLiter(s) Nebulizer every 6 hours PRN      VITAL SIGNS: Last 24 Hours  T(C): 36.7 (15 Sep 2018 05:35), Max: 36.8 (14 Sep 2018 21:00)  T(F): 98 (15 Sep 2018 05:35), Max: 98.3 (14 Sep 2018 21:00)  HR: 69 (15 Sep 2018 05:35) (64 - 71)  BP: 137/63 (15 Sep 2018 05:35) (99/57 - 137/63)  BP(mean): --  RR: 18 (15 Sep 2018 05:35) (18 - 18)  SpO2: 92% (14 Sep 2018 19:36) (92% - 93%)    LABS:                        RADIOLOGY:      PHYSICAL EXAM:    GENERAL: No acute distress, afebrile, BP borderline at baseline. Alert. Oriented to people (knows his wife's name).   HEENT:  Sclera injected  PULMONARY: Lung fields clear bilaterally  CARDIOVASCULAR: Heart sounds regular   GASTROINTESTINAL: Soft, nondistended, bowel sounds audible   MUSCULOSKELETAL:  No lower extremity edema, positive peripheral pulses. Calves not warm / tender to touch   NEUROLOGY: Mild weakness right upper and lower extremities       ADMISSION SUMMARY  Patient is a 77y old Male who presents with a chief complaint of UTI (14 Sep 2018 08:02)  Currently admitted to medicine with the primary diagnosis of UTI (urinary tract infection)        ASSESSMENT & PLAN      77 M w/ PMHx  of htn, dld, cva x4 w/ r hemiparesis, poor historian who presents to the ed c/o generalized weakness, loss of appetite and burning on urination, found to have a positive MARCIE in the urine. Blood culture positive for coryneform Gram (+) rods. Active workup for bilateral lung nodules     #  encephalopathy:     - Blood  culture was negative   - TTE performed : could not be read as patient was reflecting all the ultrasounds per echo tech   - ID consult : Maintain antibiotics off       # Bilateral lung nodules    - CT chest w/IV contrast + CT abdomen and pelvis w/oral and IV contrast performed : The CT scan of the chest showing increase number and size of the pulmonary nodules since   August 7, 2017. No evidence of intra-abdominal metastases on CT scan abdomen and pelvis   - Patient has a history of right lower extremity sarcoma   MRI : PATIENT HAS DIFFUSE BODY METAL PIECES FROM [place]  - Oncology consulted; recommended biopsy   - Pulmonary consulted; recommends palliative care,   - IR for bx, family refuses biopsy and any kind of invasive/aggressive care.  -PSA, CEA,  are negative  - hospice     # HO HTN / DLD / CVA  - C/w home meds : Aspirin, Plavix, Atorvastatin, Clonidine     # DVT   - VA duplex of the lower extremities showed Mynor appearing DVT in the left popliteal vein. Right thigh mass measuring 2 x 2 centimeters second mass measuring 2 x 3 cm  - Patient on therapeutic Lovenox : 65 mg sc BID        # GI prophylaxis : Protonix 40 mg daily     # Diet : DASH diet     # Activity : Increase as tolerated     # Code status : FULL     # Disposition : hospice care

## 2018-09-15 NOTE — PROGRESS NOTE ADULT - ATTENDING COMMENTS
77M PMHx HTN, CVA with hemiparesis here with c/o generalized weakness, loss of appetite and burning on urination, found to have a positive MARCIE in the urine. Blood culture positive for coryneform Gram (+) rods. Active workup for bilateral lung nodules     1. Metastatic Cancer of unknown primary :   Onc, Pulm suggest biopsy. Consult IR for the same.   CT head since unable to obtain MRI due to shrapnel from [place] war  PSA , CEA, CA 19-9  May consider palliative eventually after giving the family treatment options once we have the biopsy.    2.Urinary colonization : ID d/hemal antibiotics.       3. HTN  clonidine 0.1 bid  4. Active DVT	  lovenox for life.   5. CVA  asa, plavix  lipitor 20  6. DVT ppx  lovenox  7. Anemia, chronic  trend
77M PMHx HTN, CVA with hemiparesis here with c/o generalized weakness, loss of appetite and burning on urination, found to have a positive MARCIE in the urine. Blood culture positive for coryneform Gram (+) rods. Active workup for bilateral lung nodules     1. Metastatic Cancer of unknown primary :   Per Onc, Pulm we were planning on biopsying the suprapubic mass on upcoming monday. But family chose no interventions.   Comfort measures only.   Hospice in process of arranging home O2 and other DME - and then D/c to home on hospice.    2.Urinary colonization : ID d/hemal antibiotics.       3. HTN  clonidine 0.1 bid  4. Active DVT	  lovenox for life.   5. CVA  asa, plavix  lipitor 20  6. DVT ppx  lovenox  7. Anemia, chronic  trend .      Dispo : Pending Hospice.   d/c on Monday night. Hospice will admit him tuesday morning.
77M PMHx HTN, CVA with hemiparesis here with c/o generalized weakness, loss of appetite and burning on urination, found to have a positive MARCIE in the urine. Blood culture positive for coryneform Gram (+) rods. Active workup for bilateral lung nodules     1. Metastatic Cancer of unknown primary :   Per Onc, Pulm we were planning on biopsying the suprapubic mass on upcoming monday. But family chose no interventions.   Comfort measures only.   Hospice in process of arranging home O2 and other DME - and then D/c to home on hospice.    2.Urinary colonization : ID d/hemal antibiotics.       3. HTN  clonidine 0.1 bid  4. Active DVT	  lovenox for life.   5. CVA  asa, plavix  lipitor 20  6. DVT ppx  lovenox  7. Anemia, chronic  trend .      Dispo : Pending Hospice.   d/c on Monday night. Hospice will admit him tuesday morning.
Patient seen and examined independently of resident. Case discussed with housestaff, nursing and patient
#encephalopathy  continues, metabolic ?due to underlying malignancy  continue to monitor    #lung mass  suspect lung primary, f/u pulm for possible bronch with biopsy vs. IR guided  onc follow up
77M PMHx HTN, CVA with hemiparesis here with c/o generalized weakness, loss of appetite and burning on urination, found to have a positive MARCIE in the urine. Blood culture positive for coryneform Gram (+) rods. Active workup for bilateral lung nodules     1. Metastatic Cancer of unknown primary :   Per Onc, Pulm we were planning on biopsying the suprapubic mass on upcoming monday. But family chose no interventions.   Comfot measures only.   Hospice in process of arranginh home O2 and other DME - and then D/c to home on hospice.    2.Urinary colonization : ID d/hemal antibiotics.       3. HTN  clonidine 0.1 bid  4. Active DVT	  lovenox for life.   5. CVA  asa, plavix  lipitor 20  6. DVT ppx  lovenox  7. Anemia, chronic  trend .
f/u onc for possible IR guided biopsy  repeat bcx neg  continue goals of care discussion
#dvt  start therapeutic lovenox    #lung mass  ct chest    #uti  unusual to have staph  check ct abd pelvis  vanco as above    #bacteremia  gram pos rods ?listeria  vanco  f/u ID
Pt seen and examined at bedside.  Pt AAO x1. Denies any complaints today.  No CP, Sob.   Exam as above  On a 1:1 sit due to confusion   Labs reviewed   Imaging reviewed  A/P as above.   Metebolic encephalopathy 2/2 UTI - continue Abx  Continue home meds.

## 2018-09-16 RX ADMIN — GABAPENTIN 100 MILLIGRAM(S): 400 CAPSULE ORAL at 17:14

## 2018-09-16 RX ADMIN — Medication 81 MILLIGRAM(S): at 11:09

## 2018-09-16 RX ADMIN — CLOPIDOGREL BISULFATE 75 MILLIGRAM(S): 75 TABLET, FILM COATED ORAL at 11:09

## 2018-09-16 RX ADMIN — ENOXAPARIN SODIUM 65 MILLIGRAM(S): 100 INJECTION SUBCUTANEOUS at 17:14

## 2018-09-16 RX ADMIN — MAGNESIUM OXIDE 400 MG ORAL TABLET 400 MILLIGRAM(S): 241.3 TABLET ORAL at 11:09

## 2018-09-16 RX ADMIN — ATORVASTATIN CALCIUM 20 MILLIGRAM(S): 80 TABLET, FILM COATED ORAL at 22:04

## 2018-09-16 RX ADMIN — TAMSULOSIN HYDROCHLORIDE 0.4 MILLIGRAM(S): 0.4 CAPSULE ORAL at 22:04

## 2018-09-16 RX ADMIN — Medication 0.1 MILLIGRAM(S): at 17:14

## 2018-09-16 RX ADMIN — ENOXAPARIN SODIUM 65 MILLIGRAM(S): 100 INJECTION SUBCUTANEOUS at 06:24

## 2018-09-16 NOTE — PROGRESS NOTE ADULT - ASSESSMENT
77M PMHx HTN, CVA with hemiparesis here with c/o generalized weakness, loss of appetite and burning on urination, found to have a positive MARCIE in the urine. Blood culture positive for coryneform Gram (+) rods. Active workup for bilateral lung nodules     1. Metastatic Cancer of unknown primary :   Per Onc, Pulm we were planning on biopsying the suprapubic mass on upcoming monday. But family chose no interventions.   Comfort measures only.   Hospice in process of arranging home O2 and other DME - and then D/c to home on hospice.    2.Urinary colonization : ID d/hemal antibiotics.       3. HTN  clonidine 0.1 bid  4. Active DVT	  lovenox for life.   5. CVA  asa, plavix  lipitor 20  6. DVT ppx  lovenox  7. Anemia, chronic  trend .      Dispo : Pending Hospice.   d/c on Monday night. Hospice will admit him tuesday morning.

## 2018-09-16 NOTE — PROGRESS NOTE ADULT - SUBJECTIVE AND OBJECTIVE BOX
S : No new complaints      All other pertinent ROS negative.      Vital Signs Last 24 Hrs  T(C): 36.1 (16 Sep 2018 13:26), Max: 37.3 (15 Sep 2018 20:52)  T(F): 97 (16 Sep 2018 13:26), Max: 99.2 (15 Sep 2018 20:52)  HR: 75 (16 Sep 2018 13:26) (56 - 75)  BP: 135/60 (16 Sep 2018 13:26) (101/57 - 135/60)  BP(mean): --  RR: 18 (16 Sep 2018 13:26) (18 - 18)  SpO2: --  PHYSICAL EXAM:    No change    MEDICATIONS:  MEDICATIONS  (STANDING):  aspirin  chewable 81 milliGRAM(s) Oral daily  atorvastatin 20 milliGRAM(s) Oral at bedtime  cloNIDine 0.1 milliGRAM(s) Oral two times a day  clopidogrel Tablet 75 milliGRAM(s) Oral daily  enoxaparin Injectable 65 milliGRAM(s) SubCutaneous two times a day  gabapentin 100 milliGRAM(s) Oral two times a day  magnesium oxide 400 milliGRAM(s) Oral daily  pantoprazole    Tablet 40 milliGRAM(s) Oral before breakfast  tamsulosin 0.4 milliGRAM(s) Oral at bedtime      LABS: All Labs Reviewed:                        10.4   5.78  )-----------( 284      ( 15 Sep 2018 11:23 )             30.8                 Blood Culture:     Radiology: reviewed

## 2018-09-17 VITALS
TEMPERATURE: 98 F | DIASTOLIC BLOOD PRESSURE: 59 MMHG | RESPIRATION RATE: 18 BRPM | HEART RATE: 68 BPM | SYSTOLIC BLOOD PRESSURE: 98 MMHG

## 2018-09-17 RX ORDER — ATORVASTATIN CALCIUM 80 MG/1
1 TABLET, FILM COATED ORAL
Qty: 0 | Refills: 0 | COMMUNITY
Start: 2018-09-17

## 2018-09-17 RX ORDER — ENOXAPARIN SODIUM 100 MG/ML
65 INJECTION SUBCUTANEOUS
Qty: 0 | Refills: 0 | COMMUNITY
Start: 2018-09-17

## 2018-09-17 RX ADMIN — ENOXAPARIN SODIUM 65 MILLIGRAM(S): 100 INJECTION SUBCUTANEOUS at 17:47

## 2018-09-17 RX ADMIN — PANTOPRAZOLE SODIUM 40 MILLIGRAM(S): 20 TABLET, DELAYED RELEASE ORAL at 08:26

## 2018-09-17 RX ADMIN — Medication 81 MILLIGRAM(S): at 11:28

## 2018-09-17 RX ADMIN — CLOPIDOGREL BISULFATE 75 MILLIGRAM(S): 75 TABLET, FILM COATED ORAL at 11:28

## 2018-09-17 RX ADMIN — GABAPENTIN 100 MILLIGRAM(S): 400 CAPSULE ORAL at 07:28

## 2018-09-17 RX ADMIN — GABAPENTIN 100 MILLIGRAM(S): 400 CAPSULE ORAL at 17:47

## 2018-09-17 RX ADMIN — Medication 0.1 MILLIGRAM(S): at 07:28

## 2018-09-17 RX ADMIN — MAGNESIUM OXIDE 400 MG ORAL TABLET 400 MILLIGRAM(S): 241.3 TABLET ORAL at 11:28

## 2018-09-17 RX ADMIN — Medication 0.1 MILLIGRAM(S): at 17:47

## 2018-09-17 NOTE — PROGRESS NOTE ADULT - REASON FOR ADMISSION
UTI
Encephalopathy, UTI
UTI
SOB
UTI
metabolic encephalopathy, lung nodules
UTI

## 2018-09-17 NOTE — PROGRESS NOTE ADULT - PROVIDER SPECIALTY LIST ADULT
Hospitalist
Infectious Disease
Internal Medicine

## 2018-09-17 NOTE — PROGRESS NOTE ADULT - ASSESSMENT
TRAV GOVEA 77y Male  MRN#: [id number]   CODE STATUS:FULLCODE      SUBJECTIVE  Patient is a 77y old Male who presents with a chief complaint of SOB (16 Sep 2018 16:09)  Currently admitted to medicine with the primary diagnosis of UTI (urinary tract infection)  Today is hospital day 15d, and this morning he is resting comfortably in bed and reports no overnight events.       OBJECTIVE  PAST MEDICAL & SURGICAL HISTORY  Hypertension  CVA (cerebral vascular accident)    ALLERGIES:  penicillin (Unknown)    MEDICATIONS:  STANDING MEDICATIONS  aspirin  chewable 81 milliGRAM(s) Oral daily  atorvastatin 20 milliGRAM(s) Oral at bedtime  cloNIDine 0.1 milliGRAM(s) Oral two times a day  clopidogrel Tablet 75 milliGRAM(s) Oral daily  enoxaparin Injectable 65 milliGRAM(s) SubCutaneous two times a day  gabapentin 100 milliGRAM(s) Oral two times a day  magnesium oxide 400 milliGRAM(s) Oral daily  pantoprazole    Tablet 40 milliGRAM(s) Oral before breakfast  tamsulosin 0.4 milliGRAM(s) Oral at bedtime    PRN MEDICATIONS  ALBUTerol/ipratropium for Nebulization 3 milliLiter(s) Nebulizer every 6 hours PRN      VITAL SIGNS: Last 24 Hours  T(C): 37.1 (17 Sep 2018 05:19), Max: 37.2 (16 Sep 2018 21:03)  T(F): 98.8 (17 Sep 2018 05:19), Max: 99 (16 Sep 2018 21:03)  HR: 67 (17 Sep 2018 05:19) (65 - 75)  BP: 124/58 (17 Sep 2018 05:19) (124/58 - 135/60)  BP(mean): --  RR: 18 (17 Sep 2018 05:19) (18 - 18)  SpO2: 95% (17 Sep 2018 07:45) (95% - 95%)    LABS:                        RADIOLOGY:      PHYSICAL EXAM:          ADMISSION SUMMARY  Patient is a 77y old Male who presents with a chief complaint of SOB (16 Sep 2018 16:09)  Currently admitted to medicine with the primary diagnosis of UTI (urinary tract infection)        ASSESSMENT & PLAN TRAV GOVEA 77y Male  MRN#: [id number]   CODE STATUS:FULLCODE      SUBJECTIVE  Patient is a 77y old Male who presents with a chief complaint of SOB (16 Sep 2018 16:09)  Currently admitted to medicine with the primary diagnosis of UTI (urinary tract infection)  Today is hospital day 15d, and this morning he is resting comfortably in bed and reports no overnight events.       OBJECTIVE  PAST MEDICAL & SURGICAL HISTORY  Hypertension  CVA (cerebral vascular accident)    ALLERGIES:  penicillin (Unknown)    MEDICATIONS:  STANDING MEDICATIONS  aspirin  chewable 81 milliGRAM(s) Oral daily  atorvastatin 20 milliGRAM(s) Oral at bedtime  cloNIDine 0.1 milliGRAM(s) Oral two times a day  clopidogrel Tablet 75 milliGRAM(s) Oral daily  enoxaparin Injectable 65 milliGRAM(s) SubCutaneous two times a day  gabapentin 100 milliGRAM(s) Oral two times a day  magnesium oxide 400 milliGRAM(s) Oral daily  pantoprazole    Tablet 40 milliGRAM(s) Oral before breakfast  tamsulosin 0.4 milliGRAM(s) Oral at bedtime    PRN MEDICATIONS  ALBUTerol/ipratropium for Nebulization 3 milliLiter(s) Nebulizer every 6 hours PRN      VITAL SIGNS: Last 24 Hours  T(C): 37.1 (17 Sep 2018 05:19), Max: 37.2 (16 Sep 2018 21:03)  T(F): 98.8 (17 Sep 2018 05:19), Max: 99 (16 Sep 2018 21:03)  HR: 67 (17 Sep 2018 05:19) (65 - 75)  BP: 124/58 (17 Sep 2018 05:19) (124/58 - 135/60)  BP(mean): --  RR: 18 (17 Sep 2018 05:19) (18 - 18)  SpO2: 95% (17 Sep 2018 07:45) (95% - 95%)    LABS:                        RADIOLOGY:      PHYSICAL EXAM:    GENERAL: No acute distress, afebrile, BP borderline at baseline. Alert. Oriented to people (knows his wife's name).   HEENT:  Sclera injected  PULMONARY: Lung fields clear bilaterally  CARDIOVASCULAR: Heart sounds regular   GASTROINTESTINAL: Soft, nondistended, bowel sounds audible   MUSCULOSKELETAL:  No lower extremity edema, positive peripheral pulses. Calves not warm / tender to touch   NEUROLOGY: Mild weakness right upper and lower extremities         ADMISSION SUMMARY  Patient is a 77y old Male who presents with a chief complaint of SOB (16 Sep 2018 16:09)  Currently admitted to medicine with the primary diagnosis of UTI (urinary tract infection)        ASSESSMENT & PLAN        77 M w/ PMHx  of htn, dld, cva x4 w/ r hemiparesis, poor historian who presents to the ed c/o generalized weakness, loss of appetite and burning on urination, found to have a positive MARCIE in the urine. Blood culture positive for coryneform Gram (+) rods. Active workup for bilateral lung nodules     #  encephalopathy:     - Blood  culture was negative   - TTE performed : could not be read as patient was reflecting all the ultrasounds per echo tech   - ID consult : Maintain antibiotics off       # Bilateral lung nodules    - CT chest w/IV contrast + CT abdomen and pelvis w/oral and IV contrast performed : The CT scan of the chest showing increase number and size of the pulmonary nodules since   August 7, 2017. No evidence of intra-abdominal metastases on CT scan abdomen and pelvis   - Patient has a history of right lower extremity sarcoma   MRI : PATIENT HAS DIFFUSE BODY METAL PIECES FROM [place]  - Oncology consulted; recommended biopsy   - Pulmonary consulted; recommends palliative care,   - IR for bx, family refuses biopsy and any kind of invasive/aggressive care.  -PSA, CEA,  are negative  - hospice     # HO HTN / DLD / CVA  - C/w home meds : Aspirin, Plavix, Atorvastatin, Clonidine     # DVT   - VA duplex of the lower extremities showed Mynor appearing DVT in the left popliteal vein. Right thigh mass measuring 2 x 2 centimeters second mass measuring 2 x 3 cm  - Patient on therapeutic Lovenox : 65 mg sc BID        # GI prophylaxis : Protonix 40 mg daily     # Diet : DASH diet     # Activity : Increase as tolerated     # Code status : FULL     # Disposition : hospice care

## 2018-09-17 NOTE — CHART NOTE - NSCHARTNOTEFT_GEN_A_CORE
Registered Dietitian Follow-Up (limited)    Pt. continues to eat with good appetite, no acute GI distress noted. Pt. is tolerating DASH/TLC diet order. Labs/meds reviewed. No RD intervention at this time. Will continue to monitor pt.

## 2018-09-18 ENCOUNTER — OUTPATIENT (OUTPATIENT)
Dept: OUTPATIENT SERVICES | Facility: HOSPITAL | Age: 78
LOS: 1 days | End: 2018-09-18

## 2018-09-18 DIAGNOSIS — C34.90 MALIGNANT NEOPLASM OF UNSPECIFIED PART OF UNSPECIFIED BRONCHUS OR LUNG: ICD-10-CM

## 2018-09-18 PROBLEM — I10 ESSENTIAL (PRIMARY) HYPERTENSION: Chronic | Status: ACTIVE | Noted: 2018-09-02

## 2018-09-18 PROBLEM — I63.9 CEREBRAL INFARCTION, UNSPECIFIED: Chronic | Status: ACTIVE | Noted: 2018-09-02

## 2018-09-19 DIAGNOSIS — I67.89 OTHER CEREBROVASCULAR DISEASE: ICD-10-CM

## 2018-09-19 DIAGNOSIS — I10 ESSENTIAL (PRIMARY) HYPERTENSION: ICD-10-CM

## 2018-09-19 DIAGNOSIS — N39.0 URINARY TRACT INFECTION, SITE NOT SPECIFIED: ICD-10-CM

## 2018-09-21 DIAGNOSIS — N40.0 BENIGN PROSTATIC HYPERPLASIA WITHOUT LOWER URINARY TRACT SYMPTOMS: ICD-10-CM

## 2018-09-21 DIAGNOSIS — Z22.39 CARRIER OF OTHER SPECIFIED BACTERIAL DISEASES: ICD-10-CM

## 2018-09-21 DIAGNOSIS — Z98.890 OTHER SPECIFIED POSTPROCEDURAL STATES: ICD-10-CM

## 2018-09-21 DIAGNOSIS — Z51.5 ENCOUNTER FOR PALLIATIVE CARE: ICD-10-CM

## 2018-09-21 DIAGNOSIS — F03.90 UNSPECIFIED DEMENTIA WITHOUT BEHAVIORAL DISTURBANCE: ICD-10-CM

## 2018-09-21 DIAGNOSIS — Z79.02 LONG TERM (CURRENT) USE OF ANTITHROMBOTICS/ANTIPLATELETS: ICD-10-CM

## 2018-09-21 DIAGNOSIS — Z79.82 LONG TERM (CURRENT) USE OF ASPIRIN: ICD-10-CM

## 2018-09-21 DIAGNOSIS — G93.41 METABOLIC ENCEPHALOPATHY: ICD-10-CM

## 2018-09-21 DIAGNOSIS — Z88.0 ALLERGY STATUS TO PENICILLIN: ICD-10-CM

## 2018-09-21 DIAGNOSIS — B95.61 METHICILLIN SUSCEPTIBLE STAPHYLOCOCCUS AUREUS INFECTION AS THE CAUSE OF DISEASES CLASSIFIED ELSEWHERE: ICD-10-CM

## 2018-09-21 DIAGNOSIS — K57.30 DIVERTICULOSIS OF LARGE INTESTINE WITHOUT PERFORATION OR ABSCESS WITHOUT BLEEDING: ICD-10-CM

## 2018-09-21 DIAGNOSIS — C78.00 SECONDARY MALIGNANT NEOPLASM OF UNSPECIFIED LUNG: ICD-10-CM

## 2018-09-21 DIAGNOSIS — D64.9 ANEMIA, UNSPECIFIED: ICD-10-CM

## 2018-09-21 DIAGNOSIS — I69.351 HEMIPLEGIA AND HEMIPARESIS FOLLOWING CEREBRAL INFARCTION AFFECTING RIGHT DOMINANT SIDE: ICD-10-CM

## 2018-09-21 DIAGNOSIS — Z85.89 PERSONAL HISTORY OF MALIGNANT NEOPLASM OF OTHER ORGANS AND SYSTEMS: ICD-10-CM

## 2018-09-21 DIAGNOSIS — N39.0 URINARY TRACT INFECTION, SITE NOT SPECIFIED: ICD-10-CM

## 2018-09-21 DIAGNOSIS — I10 ESSENTIAL (PRIMARY) HYPERTENSION: ICD-10-CM

## 2018-09-21 DIAGNOSIS — R78.81 BACTEREMIA: ICD-10-CM

## 2018-09-21 DIAGNOSIS — C85.90 NON-HODGKIN LYMPHOMA, UNSPECIFIED, UNSPECIFIED SITE: ICD-10-CM

## 2018-09-21 DIAGNOSIS — F17.200 NICOTINE DEPENDENCE, UNSPECIFIED, UNCOMPLICATED: ICD-10-CM

## 2018-09-21 DIAGNOSIS — E78.5 HYPERLIPIDEMIA, UNSPECIFIED: ICD-10-CM

## 2018-09-21 DIAGNOSIS — C80.1 MALIGNANT (PRIMARY) NEOPLASM, UNSPECIFIED: ICD-10-CM

## 2018-09-21 DIAGNOSIS — I82.432 ACUTE EMBOLISM AND THROMBOSIS OF LEFT POPLITEAL VEIN: ICD-10-CM
